# Patient Record
Sex: MALE | Race: OTHER | HISPANIC OR LATINO | Employment: FULL TIME | URBAN - METROPOLITAN AREA
[De-identification: names, ages, dates, MRNs, and addresses within clinical notes are randomized per-mention and may not be internally consistent; named-entity substitution may affect disease eponyms.]

---

## 2018-09-17 ENCOUNTER — APPOINTMENT (EMERGENCY)
Dept: RADIOLOGY | Facility: HOSPITAL | Age: 58
End: 2018-09-17

## 2018-09-17 ENCOUNTER — HOSPITAL ENCOUNTER (EMERGENCY)
Facility: HOSPITAL | Age: 58
Discharge: HOME | End: 2018-09-17
Attending: EMERGENCY MEDICINE | Admitting: EMERGENCY MEDICINE

## 2018-09-17 VITALS
HEART RATE: 91 BPM | RESPIRATION RATE: 20 BRPM | OXYGEN SATURATION: 95 % | SYSTOLIC BLOOD PRESSURE: 138 MMHG | DIASTOLIC BLOOD PRESSURE: 83 MMHG | TEMPERATURE: 96.9 F

## 2018-09-17 DIAGNOSIS — T40.1X1A HEROIN OVERDOSE (HCC): Primary | ICD-10-CM

## 2018-09-17 PROCEDURE — 71046 X-RAY EXAM CHEST 2 VIEWS: CPT

## 2018-09-17 PROCEDURE — 96374 THER/PROPH/DIAG INJ IV PUSH: CPT

## 2018-09-17 PROCEDURE — 99284 EMERGENCY DEPT VISIT MOD MDM: CPT

## 2018-09-17 RX ORDER — NALOXONE HYDROCHLORIDE 1 MG/ML
INJECTION INTRAMUSCULAR; INTRAVENOUS; SUBCUTANEOUS
Status: DISCONTINUED
Start: 2018-09-17 | End: 2018-09-17 | Stop reason: HOSPADM

## 2018-09-17 RX ORDER — ONDANSETRON 2 MG/ML
INJECTION INTRAMUSCULAR; INTRAVENOUS
Status: COMPLETED
Start: 2018-09-17 | End: 2018-09-17

## 2018-09-17 RX ORDER — ONDANSETRON 2 MG/ML
4 INJECTION INTRAMUSCULAR; INTRAVENOUS ONCE
Status: COMPLETED | OUTPATIENT
Start: 2018-09-17 | End: 2018-09-17

## 2018-09-17 RX ADMIN — ONDANSETRON 4 MG: 2 INJECTION INTRAMUSCULAR; INTRAVENOUS at 16:14

## 2018-09-17 RX ADMIN — ONDANSETRON 4 MG: 2 INJECTION, SOLUTION INTRAMUSCULAR; INTRAVENOUS at 16:14

## 2018-09-17 NOTE — ED PROVIDER NOTES
History  Chief Complaint   Patient presents with    Overdose - Accidental     pt states that he used 1 bag of heroin and has been drinking  pt given 2 mg intranasal narcan and 2 mg IV narcan     Patient is a 29-year-old male  History of heroin abuse  Was drinking alcohol today  Found unresponsive  EMS administered 2 mg of Narcan intranasally and 4 mg IV  Patient awoke  He began the vomit  Symptoms were severe  Improved with Narcan  None       History reviewed  No pertinent past medical history  History reviewed  No pertinent surgical history  History reviewed  No pertinent family history  I have reviewed and agree with the history as documented  Social History   Substance Use Topics    Smoking status: Never Smoker    Smokeless tobacco: Never Used    Alcohol use Yes        Review of Systems   Constitutional: Negative for chills and fever  Gastrointestinal: Positive for nausea and vomiting  Negative for abdominal pain  All other systems reviewed and are negative  Physical Exam  Physical Exam   Constitutional: He is oriented to person, place, and time  He appears well-developed and well-nourished  No distress  HENT:   Head: Normocephalic and atraumatic  Mouth/Throat: Oropharynx is clear and moist    Eyes: Conjunctivae and EOM are normal  Pupils are equal, round, and reactive to light  Right eye exhibits no discharge  Left eye exhibits no discharge  No scleral icterus  Neck: Normal range of motion  Neck supple  Cardiovascular: Normal rate, regular rhythm, normal heart sounds and intact distal pulses  Exam reveals no gallop and no friction rub  No murmur heard  Pulmonary/Chest: Effort normal and breath sounds normal  No respiratory distress  He has no wheezes  He has no rales  Abdominal: Soft  Bowel sounds are normal  He exhibits no distension  There is no tenderness  There is no rebound and no guarding  Musculoskeletal: Normal range of motion   He exhibits no edema, tenderness or deformity  Neurological: He is alert and oriented to person, place, and time  He has normal strength  No sensory deficit  GCS eye subscore is 4  GCS verbal subscore is 5  GCS motor subscore is 6  Skin: Skin is warm and dry  No rash noted  He is not diaphoretic  Psychiatric: He has a normal mood and affect  Vitals reviewed  Vital Signs  ED Triage Vitals [09/17/18 1606]   Temperature Pulse Respirations Blood Pressure SpO2   (!) 96 9 °F (36 1 °C) 92 14 124/77 95 %      Temp Source Heart Rate Source Patient Position - Orthostatic VS BP Location FiO2 (%)   Temporal Monitor Lying Left arm --      Pain Score       --           Vitals:    09/17/18 1606 09/17/18 1736   BP: 124/77    Pulse: 92 93   Patient Position - Orthostatic VS: Lying        Visual Acuity      ED Medications  Medications   naloxone (NARCAN) 2 MG/2ML injection **AcuDose Override Pull** (not administered)   ondansetron (ZOFRAN) injection 4 mg (4 mg Intravenous Given 9/17/18 1614)       Diagnostic Studies  Results Reviewed     None                 XR chest 2 views   ED Interpretation by Radha Owens MD (09/17 1702)   No infiltrates  No congestive heart failure  Final Result by Salvador Velasquez MD (09/17 1646)      No acute cardiopulmonary disease  Workstation performed: [id number]                    Procedures  Procedures       Phone Contacts  ED Phone Contact    ED Course                               [facility]  Number of Diagnoses or Management Options  Diagnosis management comments: Patient observed  No relapse  Appropriate for discharge and outpatient management      CritCare Time    Disposition  Final diagnoses:   Heroin overdose     Time reflects when diagnosis was documented in both MDM as applicable and the Disposition within this note     Time User Action Codes Description Comment    9/17/2018  5:56 PM Kandi, [address] Heroin overdose       ED Disposition     ED Disposition Condition Comment    Discharge  Wilfrido Martin discharge to home/self care  Condition at discharge: Good        Follow-up Information     Follow up With Specialties Details Why Contact Info    [employer]   Follow-up with family doctor later this week for re-evaluation, call for referral [phone]            Patient's Medications    No medications on file     No discharge procedures on file      ED Provider  Electronically Signed by           Ramona Phipps MD  09/17/18 [address] [place] [address] Flash Delong MD  09/17/18 Adin Henderson

## 2018-09-17 NOTE — DISCHARGE INSTRUCTIONS
Narcotic Abuse   WHAT YOU NEED TO KNOW:   Narcotics are medicines used to decrease or take away severe pain  Narcotics may also be called opioids  Some common names of narcotics ordered by a doctor are codeine and morphine  Heroin is an illegal street drug that is made from morphine  DISCHARGE INSTRUCTIONS:   Return to the emergency department if:   · You are very drowsy  · Your speech is slurred  · You have trouble thinking, remembering things, or focusing  Contact your healthcare provider if:   · You want help or information on how to stop using or abusing narcotics  Follow up with your healthcare provider as directed:  Write down your questions so you remember to ask them during your visits  Narcotic intoxication  usually lasts for several hours  You may have the following during or after you use narcotics:  · Behavior or mood changes, such as a great feeling followed by the feeling that you do not care about anyone or anything    · Trouble thinking, remembering things, or focusing    · Small pupils    · Feeling very drowsy    · Slurred speech  Narcotic withdrawal  occurs if you stop using narcotics after using them heavily over a period of time  Signs and symptoms may begin within minutes or days and continue for days or even months:  · Depression and anxiety    · Nausea or vomiting    · Muscle aches    · Watery eyes or runny nose    · Large pupils    · Sweating or goosebumps on your skin    · Diarrhea    · Fever    · Trouble sleeping  How narcotics can harm a pregnant woman and her baby:   · Tell your healthcare provider right away if you are trying to get pregnant or you are pregnant and you are using narcotics  Your doctor may suggest other medicines to control pain and prevent withdrawal  If you go through withdrawal while pregnant, you may miscarry your baby, or he may be stillborn  He may be very small and have other medical problems      · When your baby is born, he may show signs of withdrawal  This includes unexpected weight loss, poor feeding, and more crying than normal  Your baby may also have a fever, vomit, and have diarrhea  He may also have learning problems or other health issues when he gets older  If you have a baby and you are using narcotics, you may have trouble caring for your baby  Narcotics may be passed to your baby through breast milk  Talk to your healthcare provider before breastfeeding your baby if you are using narcotics  For support and more information:   · Substance Abuse and [place] , [address] [place]  Web Address: https://incrediblue/  · [facility] on Drug Abuse  [address] [place] [phone]  Phone: 0- 180 - 699-0185  Web Address: www stuart nih gov  © 2017 [address] [place] [address] Street is for End User's use only and may not be sold, redistributed or otherwise used for commercial purposes  All illustrations and images included in CareNotes® are the copyrighted property of A D A M , Inc  or Reynaldo Moran  The above information is an  only  It is not intended as medical advice for individual conditions or treatments  Talk to your doctor, nurse or pharmacist before following any medical regimen to see if it is safe and effective for you

## 2018-09-17 NOTE — ED NOTES
Pt walking with a steady gait with no assisstance  Pt walked across ER michelle of MD  Per MD pt may go home on his own       Stephanie Loredo RN  09/17/18 3107

## 2018-09-17 NOTE — ED NOTES
MD notified that pt's O2 is in the mid 80's when he falls asleep   Wants pt to be placed on 2 Liters O2 through nasal cannula     Mel Cormier RN  09/17/18 0099

## 2018-09-17 NOTE — ED NOTES
attempted to call phone numbers supplied by pt so someone can pick him up but phone numbers are not in service  Also called phone number in demographics and that was also not in service       Kavon Burgess, MIGUEL A  09/17/18 4211

## 2021-02-01 ENCOUNTER — HOSPITAL ENCOUNTER (EMERGENCY)
Facility: HOSPITAL | Age: 61
Discharge: HOME | End: 2021-02-01
Attending: EMERGENCY MEDICINE | Admitting: EMERGENCY MEDICINE
Payer: COMMERCIAL

## 2021-02-01 VITALS
DIASTOLIC BLOOD PRESSURE: 69 MMHG | WEIGHT: 185 LBS | RESPIRATION RATE: 12 BRPM | HEART RATE: 91 BPM | HEIGHT: 71 IN | OXYGEN SATURATION: 93 % | SYSTOLIC BLOOD PRESSURE: 113 MMHG | BODY MASS INDEX: 25.9 KG/M2 | TEMPERATURE: 98.8 F

## 2021-02-01 DIAGNOSIS — T50.901A ACCIDENTAL OVERDOSE, INITIAL ENCOUNTER: Primary | ICD-10-CM

## 2021-02-01 LAB
ALBUMIN SERPL BCP-MCNC: 4.1 G/DL (ref 3–5.2)
ALP SERPL-CCNC: 60 U/L (ref 43–122)
ALT SERPL W P-5'-P-CCNC: 20 U/L (ref 9–52)
ANION GAP SERPL CALCULATED.3IONS-SCNC: 15 MMOL/L (ref 5–14)
AST SERPL W P-5'-P-CCNC: 29 U/L (ref 17–59)
ATRIAL RATE: 95 BPM
BASOPHILS # BLD AUTO: 0 THOUSANDS/ΜL (ref 0–0.1)
BASOPHILS NFR BLD AUTO: 1 % (ref 0–1)
BILIRUB SERPL-MCNC: 0.7 MG/DL
BUN SERPL-MCNC: 16 MG/DL (ref 5–25)
CALCIUM SERPL-MCNC: 8.3 MG/DL (ref 8.4–10.2)
CHLORIDE SERPL-SCNC: 100 MMOL/L (ref 97–108)
CK MB SERPL-MCNC: 1.4 % (ref 0–2.5)
CK MB SERPL-MCNC: 6 NG/ML (ref 0–2.4)
CK SERPL-CCNC: 419 U/L (ref 55–170)
CO2 SERPL-SCNC: 19 MMOL/L (ref 22–30)
CREAT SERPL-MCNC: 0.86 MG/DL (ref 0.7–1.5)
EOSINOPHIL # BLD AUTO: 0 THOUSAND/ΜL (ref 0–0.4)
EOSINOPHIL NFR BLD AUTO: 0 % (ref 0–6)
ERYTHROCYTE [DISTWIDTH] IN BLOOD BY AUTOMATED COUNT: 14.7 %
GFR SERPL CREATININE-BSD FRML MDRD: 94 ML/MIN/1.73SQ M
GLUCOSE SERPL-MCNC: 285 MG/DL (ref 70–99)
HCT VFR BLD AUTO: 43.4 % (ref 41–53)
HGB BLD-MCNC: 14 G/DL (ref 13.5–17.5)
LACTATE SERPL-SCNC: 5 MMOL/L (ref 0.7–2)
LG PLATELETS BLD QL SMEAR: PRESENT
LYMPHOCYTES # BLD AUTO: 1.7 THOUSANDS/ΜL (ref 0.5–4)
LYMPHOCYTES NFR BLD AUTO: 28 % (ref 25–45)
MCH RBC QN AUTO: 31.9 PG (ref 26–34)
MCHC RBC AUTO-ENTMCNC: 32.2 G/DL (ref 31–36)
MCV RBC AUTO: 99 FL (ref 80–100)
MONOCYTES # BLD AUTO: 0.5 THOUSAND/ΜL (ref 0.2–0.9)
MONOCYTES NFR BLD AUTO: 8 % (ref 1–10)
NEUTROPHILS # BLD AUTO: 3.8 THOUSANDS/ΜL (ref 1.8–7.8)
NEUTS SEG NFR BLD AUTO: 63 % (ref 45–65)
P AXIS: 65 DEGREES
PLATELET # BLD AUTO: 145 THOUSANDS/UL (ref 150–450)
PLATELET BLD QL SMEAR: ABNORMAL
PMV BLD AUTO: 11.2 FL (ref 8.9–12.7)
POTASSIUM SERPL-SCNC: 3.8 MMOL/L (ref 3.6–5)
PR INTERVAL: 224 MS
PROT SERPL-MCNC: 6.8 G/DL (ref 5.9–8.4)
QRS AXIS: 22 DEGREES
QRSD INTERVAL: 144 MS
QT INTERVAL: 414 MS
QTC INTERVAL: 520 MS
RBC # BLD AUTO: 4.38 MILLION/UL (ref 4.5–5.9)
RBC MORPH BLD: NORMAL
SODIUM SERPL-SCNC: 134 MMOL/L (ref 137–147)
T WAVE AXIS: 16 DEGREES
TROPONIN I SERPL-MCNC: <0.01 NG/ML (ref 0–0.03)
TSH SERPL DL<=0.05 MIU/L-ACNC: 3.31 UIU/ML (ref 0.47–4.68)
VENTRICULAR RATE: 95 BPM
WBC # BLD AUTO: 6.1 THOUSAND/UL (ref 4.5–11)

## 2021-02-01 PROCEDURE — 82553 CREATINE MB FRACTION: CPT | Performed by: PHYSICIAN ASSISTANT

## 2021-02-01 PROCEDURE — 96374 THER/PROPH/DIAG INJ IV PUSH: CPT

## 2021-02-01 PROCEDURE — 83605 ASSAY OF LACTIC ACID: CPT | Performed by: PHYSICIAN ASSISTANT

## 2021-02-01 PROCEDURE — 96361 HYDRATE IV INFUSION ADD-ON: CPT

## 2021-02-01 PROCEDURE — 93005 ELECTROCARDIOGRAM TRACING: CPT

## 2021-02-01 PROCEDURE — 99285 EMERGENCY DEPT VISIT HI MDM: CPT

## 2021-02-01 PROCEDURE — 84443 ASSAY THYROID STIM HORMONE: CPT | Performed by: PHYSICIAN ASSISTANT

## 2021-02-01 PROCEDURE — 84484 ASSAY OF TROPONIN QUANT: CPT | Performed by: PHYSICIAN ASSISTANT

## 2021-02-01 PROCEDURE — 93010 ELECTROCARDIOGRAM REPORT: CPT | Performed by: INTERNAL MEDICINE

## 2021-02-01 PROCEDURE — 80053 COMPREHEN METABOLIC PANEL: CPT | Performed by: PHYSICIAN ASSISTANT

## 2021-02-01 PROCEDURE — 82550 ASSAY OF CK (CPK): CPT | Performed by: PHYSICIAN ASSISTANT

## 2021-02-01 PROCEDURE — 85025 COMPLETE CBC W/AUTO DIFF WBC: CPT | Performed by: PHYSICIAN ASSISTANT

## 2021-02-01 PROCEDURE — 36415 COLL VENOUS BLD VENIPUNCTURE: CPT | Performed by: PHYSICIAN ASSISTANT

## 2021-02-01 PROCEDURE — 99285 EMERGENCY DEPT VISIT HI MDM: CPT | Performed by: PHYSICIAN ASSISTANT

## 2021-02-01 RX ORDER — NALOXONE HYDROCHLORIDE 1 MG/ML
1 INJECTION INTRAMUSCULAR; INTRAVENOUS; SUBCUTANEOUS ONCE
Status: COMPLETED | OUTPATIENT
Start: 2021-02-01 | End: 2021-02-01

## 2021-02-01 RX ADMIN — SODIUM CHLORIDE 1000 ML: 0.9 INJECTION, SOLUTION INTRAVENOUS at 14:05

## 2021-02-01 RX ADMIN — SODIUM CHLORIDE 1000 ML: 0.9 INJECTION, SOLUTION INTRAVENOUS at 13:00

## 2021-02-01 RX ADMIN — NALOXONE HYDROCHLORIDE 1 MG: 1 INJECTION PARENTERAL at 14:52

## 2021-02-01 NOTE — ED NOTES
Ems said he was found down in the snow, pt cold, warm blankets and keenan hugger  Placed, pt states he snorted a little bag of heroin today and was having fun, wet clothes removed, "I feel fine now"     Marck Acosta RN  02/01/21 9880

## 2021-02-01 NOTE — ED PROVIDER NOTES
History  Chief Complaint   Patient presents with    Overdose - Accidental     44-year-old Hebrew-speaking male presents via EMS for reported heroin overdose  Patient arrives sleepy however arousable to verbal stimuli and does report the use of 1 bag of heroin  Patient is also noted to be hypothermic upon arrival   Patient denies complaints reports he is not suicidal or homicidal       History provided by:  Patient and EMS personnel   used: Yes    Overdose - Accidental  Ingested substance: heroin  Ingestion amount:  1 bag  Incident location: outside in the snow  Context: intentional ingestion ( recreationally)    Associated symptoms: no abdominal pain, no chest pain, no nausea, no shortness of breath and no vomiting        None       History reviewed  No pertinent past medical history  History reviewed  No pertinent surgical history  History reviewed  No pertinent family history  I have reviewed and agree with the history as documented  E-Cigarette/Vaping    E-Cigarette Use Never User      E-Cigarette/Vaping Substances     Social History     Tobacco Use    Smoking status: Heavy Tobacco Smoker     Packs/day: 0 50    Smokeless tobacco: Never Used   Substance Use Topics    Alcohol use: Yes     Frequency: 2-4 times a month     Drinks per session: 3 or 4    Drug use: Yes     Types: Heroin, Marijuana       Review of Systems   Constitutional: Negative for chills, fatigue and fever  HENT: Negative for congestion, ear pain, rhinorrhea and sore throat  Eyes: Negative for redness  Respiratory: Negative for chest tightness and shortness of breath  Cardiovascular: Negative for chest pain and palpitations  Gastrointestinal: Negative for abdominal pain, nausea and vomiting  Genitourinary: Negative for dysuria and hematuria  Musculoskeletal: Negative  Skin: Negative for rash  Neurological: Negative for dizziness, syncope, light-headedness and numbness         Physical Exam  Physical Exam  Vitals signs and nursing note reviewed  Constitutional:       Appearance: Normal appearance  He is well-developed  HENT:      Head: Normocephalic and atraumatic  Eyes:      General: No scleral icterus  Pupils: Pupils are equal, round, and reactive to light  Neck:      Musculoskeletal: Normal range of motion  Cardiovascular:      Rate and Rhythm: Normal rate and regular rhythm  Pulses: Normal pulses  Pulmonary:      Effort: Pulmonary effort is normal  No respiratory distress  Breath sounds: No stridor  Abdominal:      General: There is no distension  Palpations: There is no mass  Skin:     General: Skin is cool and dry  Capillary Refill: Capillary refill takes less than 2 seconds  Coloration: Skin is not jaundiced  Neurological:      Mental Status: He is alert and oriented to person, place, and time        Gait: Gait normal    Psychiatric:         Mood and Affect: Mood normal          Vital Signs  ED Triage Vitals [02/01/21 1243]   Temperature Pulse Respirations Blood Pressure SpO2   (!) 91 8 °F (33 2 °C) 96 14 158/93 93 %      Temp Source Heart Rate Source Patient Position - Orthostatic VS BP Location FiO2 (%)   Tympanic Monitor Lying Left arm --      Pain Score       --           Vitals:    02/01/21 1243 02/01/21 1324 02/01/21 1331 02/01/21 1430   BP: 158/93 107/74 105/72 113/69   Pulse: 96 93 93 91   Patient Position - Orthostatic VS: Lying  Lying Lying         Visual Acuity      ED Medications  Medications   sodium chloride 0 9 % bolus 1,000 mL (0 mL Intravenous Stopped 2/1/21 1404)   sodium chloride 0 9 % bolus 1,000 mL (1,000 mL Intravenous New Bag 2/1/21 1405)   naloxone (NARCAN) injection 1 mg (1 mg Intravenous Given 2/1/21 1452)       Diagnostic Studies  Results Reviewed     Procedure Component Value Units Date/Time    TSH [[id number]]  (Normal) Collected: 02/01/21 1320    Lab Status: Final result Specimen: Blood from Arm, Left Updated: 02/01/21 1406     TSH 3RD GENERATON 3 310 uIU/mL     Narrative:      Patients undergoing fluorescein dye angiography may retain small amounts of fluorescein in the body for 48-72 hours post procedure  Samples containing fluorescein can produce falsely depressed TSH values  If the patient had this procedure,a specimen should be resubmitted post fluorescein clearance        CKMB [[id number]]  (Abnormal) Collected: 02/01/21 1320    Lab Status: Final result Specimen: Blood from Arm, Left Updated: 02/01/21 1359     CK-MB Index 1 4 %      CK-MB 6 0 ng/mL     Troponin I [[id number]]  (Normal) Collected: 02/01/21 1320    Lab Status: Final result Specimen: Blood from Arm, Left Updated: 02/01/21 1347     Troponin I <0 01 ng/mL     Smear Review(Phlebs Do Not Order) [[id number]]  (Abnormal) Collected: 02/01/21 1255    Lab Status: Final result Specimen: Blood from Arm, Left Updated: 02/01/21 1339     RBC Morphology Normal     Platelet Estimate Borderline     Large Platelet Present    CK Total with Reflex CKMB [[id number]]  (Abnormal) Collected: 02/01/21 1320    Lab Status: Final result Specimen: Blood from Arm, Left Updated: 02/01/21 1338     Total  U/L     Comprehensive metabolic panel [[id number]]  (Abnormal) Collected: 02/01/21 1320    Lab Status: Final result Specimen: Blood from Arm, Left Updated: 02/01/21 1338     Sodium 134 mmol/L      Potassium 3 8 mmol/L      Chloride 100 mmol/L      CO2 19 mmol/L      ANION GAP 15 mmol/L      BUN 16 mg/dL      Creatinine 0 86 mg/dL      Glucose 285 mg/dL      Calcium 8 3 mg/dL      AST 29 U/L      ALT 20 U/L      Alkaline Phosphatase 60 U/L      Total Protein 6 8 g/dL      Albumin 4 1 g/dL      Total Bilirubin 0 70 mg/dL      eGFR 94 ml/min/1 73sq m     Narrative:      Odin guidelines for Chronic Kidney Disease (CKD):     Stage 1 with normal or high GFR (GFR > 90 mL/min/1 73 square meters)    Stage 2 Mild CKD (GFR = 60-89 mL/min/1 73 square meters)    Stage 3A Moderate CKD (GFR = 45-59 mL/min/1 73 square meters)    Stage 3B Moderate CKD (GFR = 30-44 mL/min/1 73 square meters)    Stage 4 Severe CKD (GFR = 15-29 mL/min/1 73 square meters)    Stage 5 End Stage CKD (GFR <15 mL/min/1 73 square meters)  Note: GFR calculation is accurate only with a steady state creatinine    Lactic acid, plasma [[id number]]  (Abnormal) Collected: 02/01/21 1255    Lab Status: Final result Specimen: Blood from Arm, Right Updated: 02/01/21 1318     LACTIC ACID 5 0 mmol/L     Narrative:      Result may be elevated if tourniquet was used during collection      Lactic acid 2 Hours [[id number]]     Lab Status: No result Specimen: Blood     CBC and differential [[id number]]  (Abnormal) Collected: 02/01/21 1255    Lab Status: Final result Specimen: Blood from Arm, Left Updated: 02/01/21 1304     WBC 6 10 Thousand/uL      RBC 4 38 Million/uL      Hemoglobin 14 0 g/dL      Hematocrit 43 4 %      MCV 99 fL      MCH 31 9 pg      MCHC 32 2 g/dL      RDW 14 7 %      MPV 11 2 fL      Platelets 341 Thousands/uL      Neutrophils Relative 63 %      Lymphocytes Relative 28 %      Monocytes Relative 8 %      Eosinophils Relative 0 %      Basophils Relative 1 %      Neutrophils Absolute 3 80 Thousands/µL      Lymphocytes Absolute 1 70 Thousands/µL      Monocytes Absolute 0 50 Thousand/µL      Eosinophils Absolute 0 00 Thousand/µL      Basophils Absolute 0 00 Thousands/µL                  No orders to display              Procedures  ECG 12 Lead Documentation Only    Date/Time: 2/1/2021 12:52 PM  Performed by: Lizeth Godwin PA-C  Authorized by: Lizeth Godwin PA-C     Indications / Diagnosis:  Hypothermia  Patient location:  ED  Interpretation:     Interpretation: normal    Rate:     ECG rate:  95    ECG rate assessment: normal    Rhythm:     Rhythm: sinus rhythm    Ectopy:     Ectopy: none    QRS:     QRS axis:  Left    QRS intervals:  Normal  Conduction:     Conduction: normal    ST segments:     ST segments:  Normal  T waves:     T waves: non-specific    Q waves:     Q waves:  III  Comments:      qtc 520             ED Course  ED Course as of Feb 01 1521   Mon Feb 01, 2021   1411 Patient normothermic at this time, arousable to verbal stimuli, oriented and requesting to go home  Patient falls asleep very easily after interaction though  Will administer 1 additional mg of Narcan and prepare for discharge      1513 Patient's IV removed by myself, gauze pad and tape placed for hemostasis patient alert and oriented at time of discharge  Patient was reexamined at this time and informed of laboratory and/or imaging results and was found to be stable for discharge  Return to emergency department criteria was reviewed with the patient who verbalized understanding and was agreeable to discharge and the treatment plan at this time  SBIRT 20yo+      Most Recent Value   SBIRT (24 yo +)   In order to provide better care to our patients, we are screening all of our patients for alcohol and drug use  Would it be okay to ask you these screening questions? Yes Filed at: 02/01/2021 1249   Initial Alcohol Screen: US AUDIT-C    1  How often do you have a drink containing alcohol? 2 Filed at: 02/01/2021 1249   2  How many drinks containing alcohol do you have on a typical day you are drinking? 2 Filed at: 02/01/2021 1249   3a  Male UNDER 65: How often do you have five or more drinks on one occasion? 0 Filed at: 02/01/2021 1249   3b  FEMALE Any Age, or MALE 65+: How often do you have 4 or more drinks on one occassion? 0 Filed at: 02/01/2021 1249   Audit-C Score  4 Filed at: 02/01/2021 1249   MARIA C: How many times in the past year have you    Used an illegal drug or used a prescription medication for non-medical reasons? Weekly Filed at: 02/01/2021 1249   DAST-10: In the past 12 months      1  Have you used drugs other than those required for medical reasons?   1 Filed at: 02/01/2021 1242 2  Do you use more than one drug at a time? 1 Filed at: 02/01/2021 1249   3  Have you had medical problems as a result of your drug use (e g , memory loss, hepatitis, convulsions, bleeding, etc )? 0 Filed at: 02/01/2021 1249   4  Have you had "blackouts" or "flashbacks" as a result of drug use? YesNo  1 Filed at: 02/01/2021 1249   5  Do you ever feel bad or guilty about your drug use? 0 Filed at: 02/01/2021 1249   6  Does your spouse (or parent) ever complain about your involvement with drugs? 0 Filed at: 02/01/2021 1249   7  Have you neglected your family because of your use of drugs? 0 Filed at: 02/01/2021 1249   8  Have you engaged in illegal activities in order to obtain drugs? 0 Filed at: 02/01/2021 1249   9  Have you ever experienced withdrawal symptoms (felt sick) when you stopped taking drugs? 0 Filed at: 02/01/2021 1249   10  Are you always able to stop using drugs when you want to?  0 Filed at: 02/01/2021 1249   DAST-10 Score  3 Filed at: 02/01/2021 1249                    MDM  Number of Diagnoses or Management Options  Accidental overdose, initial encounter:   Diagnosis management comments: All imaging and/or lab testing discussed with patient, strict return to ED precautions discussed  Patient recommended to follow up promptly with appropriate outpatient provider  Patient and/or family members verbalizes understanding and agrees with plan  Patient is stable for discharge      Portions of the record may have been created with voice recognition software  Occasional wrong word or "sound a like" substitutions may have occurred due to the inherent limitations of voice recognition software  Read the chart carefully and recognize, using context, where substitutions have occurred          Disposition  Final diagnoses:   Accidental overdose, initial encounter     Time reflects when diagnosis was documented in both MDM as applicable and the Disposition within this note     Time User Action Codes Description Comment    2/1/2021  3:09 PM Chad Chambers Accidental overdose, initial encounter       ED Disposition     ED Disposition Condition Date/Time Comment    Discharge Stable Mon Feb 1, 2021  3:09 PM Norris Unger discharge to home/self care  Follow-up Information     Follow up With Specialties Details Why Contact Info    Genet Bray MD Family Medicine Schedule an appointment as soon as possible for a visit in 2 days As needed [address]  [phone]            Patient's Medications    No medications on file     No discharge procedures on file      PDMP Review     None          ED Provider  Electronically Signed by           Lizeth Godwin PA-C  02/01/21 0387

## 2021-06-21 ENCOUNTER — OFFICE VISIT (OUTPATIENT)
Dept: URGENT CARE | Age: 61
End: 2021-06-21

## 2021-06-21 DIAGNOSIS — Z02.4 DRIVER'S PERMIT PE (PHYSICAL EXAMINATION): Primary | ICD-10-CM

## 2021-06-21 NOTE — PROGRESS NOTES
Patient was refunded due to discussing with patient that he needs to see his PCP to have this physical done  Pt is Chilean speaking only, used  line to explain to pt why and became upset  He walked out and demanded his paperwork and took his money  Stated that he had no doctor and when offered to give him a list or number to help obtain one, started yelling in [address] and walked out the office  No physical was done today and pt was refunded       SHAYLA Toribio

## 2021-07-22 ENCOUNTER — HOSPITAL ENCOUNTER (EMERGENCY)
Facility: HOSPITAL | Age: 61
Discharge: HOME | End: 2021-07-23
Attending: EMERGENCY MEDICINE

## 2021-07-22 DIAGNOSIS — F19.10 SUBSTANCE ABUSE (HCC): Primary | ICD-10-CM

## 2021-07-22 PROCEDURE — 99284 EMERGENCY DEPT VISIT MOD MDM: CPT

## 2021-07-22 PROCEDURE — 99282 EMERGENCY DEPT VISIT SF MDM: CPT | Performed by: PHYSICIAN ASSISTANT

## 2021-07-23 VITALS
OXYGEN SATURATION: 97 % | RESPIRATION RATE: 18 BRPM | SYSTOLIC BLOOD PRESSURE: 161 MMHG | DIASTOLIC BLOOD PRESSURE: 95 MMHG | HEART RATE: 75 BPM | WEIGHT: 180 LBS | TEMPERATURE: 98 F | BODY MASS INDEX: 25.1 KG/M2

## 2021-07-23 NOTE — ED NOTES
Patient is awake, alert and oriented  States that he is ready to go home  Speech is clear  No complaints offered  Gait steady       Gabino Moreno, MIGUEL A  07/23/21 0016

## 2021-07-23 NOTE — ED PROVIDER NOTES
History  Chief Complaint   Patient presents with    Overdose - Accidental     pt smoked K2 and was found rolling in the road  no complaints on arrival      Patient is a 80-year-old male presents today via EMS for reported overdose patient admits the use of K2 was found by EMS rolling the road  Patient has no complaints upon arrival however is tangential and days erratically in behavior and speech  Patient is conscious alert oriented and admits to the use of K2 and is agreeable to an observation  In the emergency department till clinically sober  Patient denies suicidality homicidality polysubstance use  History provided by:  Patient   used: Yes        None       History reviewed  No pertinent past medical history  History reviewed  No pertinent surgical history  History reviewed  No pertinent family history  I have reviewed and agree with the history as documented  E-Cigarette/Vaping    E-Cigarette Use Never User      E-Cigarette/Vaping Substances     Social History     Tobacco Use    Smoking status: Heavy Tobacco Smoker     Packs/day: 0 50    Smokeless tobacco: Never Used   Vaping Use    Vaping Use: Never used   Substance Use Topics    Alcohol use: Yes    Drug use: Yes     Types: Heroin, Marijuana       Review of Systems   Reason unable to perform ROS: Patient denies any complaints  Constitutional: Negative for chills, fatigue and fever  HENT: Negative for congestion, ear pain, rhinorrhea and sore throat  Eyes: Negative for redness  Respiratory: Negative for chest tightness and shortness of breath  Cardiovascular: Negative for chest pain and palpitations  Gastrointestinal: Negative for abdominal pain, nausea and vomiting  Genitourinary: Negative for dysuria and hematuria  Musculoskeletal: Negative  Skin: Negative for rash  Neurological: Negative for dizziness, syncope, light-headedness and numbness         Physical Exam  Physical Exam  Vitals and nursing note reviewed  Constitutional:       Appearance: Normal appearance  He is well-developed  Comments: Patient is well-appearing in no acute distress falls asleep when not interacted with however he is conscious alert and oriented when given verbal stimuli  HENT:      Head: Normocephalic and atraumatic  Eyes:      General: No scleral icterus  Pupils: Pupils are equal, round, and reactive to light  Cardiovascular:      Rate and Rhythm: Normal rate and regular rhythm  Pulses: Normal pulses  Pulmonary:      Effort: Pulmonary effort is normal  No respiratory distress  Breath sounds: No stridor  Abdominal:      General: There is no distension  Palpations: There is no mass  Musculoskeletal:      Cervical back: Normal range of motion  Skin:     General: Skin is warm and dry  Capillary Refill: Capillary refill takes less than 2 seconds  Coloration: Skin is not jaundiced  Neurological:      Mental Status: He is alert and oriented to person, place, and time        Gait: Gait normal    Psychiatric:         Mood and Affect: Mood normal          Vital Signs  ED Triage Vitals [07/22/21 2107]   Temperature Pulse Respirations Blood Pressure SpO2   98 °F (36 7 °C) (!) 111 20 (!) 193/130 96 %      Temp Source Heart Rate Source Patient Position - Orthostatic VS BP Location FiO2 (%)   Tympanic Monitor Sitting Left arm --      Pain Score       --           Vitals:    07/22/21 2107 07/22/21 2127 07/23/21 0010   BP: (!) 193/130 123/81 161/95   Pulse: (!) 111 81 75   Patient Position - Orthostatic VS: Sitting Lying Lying         Visual Acuity  Visual Acuity      Most Recent Value   L Pupil Size (mm)  3   R Pupil Size (mm)  3          ED Medications  Medications - No data to display    Diagnostic Studies  Results Reviewed     None                 No orders to display              Procedures  Procedures         ED Course                             SBIRT 22yo+      Most Recent Value SBIRT (23 yo +)   In order to provide better care to our patients, we are screening all of our patients for alcohol and drug use  Would it be okay to ask you these screening questions? Unable to answer at this time Filed at: 07/22/2021 2111                    MDM      Disposition  Final diagnoses:   Substance abuse [facility])     Time reflects when diagnosis was documented in both MDM as applicable and the Disposition within this note     Time User Action Codes Description Comment    7/23/2021 12:06 AM Shelly Albright Add [F19 10] Substance abuse [facility])       ED Disposition     ED Disposition Condition Date/Time Comment    Discharge Stable Fri Jul 23, 2021 12:06 AM Joelle More discharge to home/self care  Follow-up Information     Follow up With Specialties Details Why Contact Info Additional [address]   [address], [facility] [place] [phone]  [address], [address], [address] [place], [place], [address] [place] [address] Avenue          There are no discharge medications for this patient  No discharge procedures on file      PDMP Review     None          ED Provider  Electronically Signed by           Andria Warner PA-C  07/26/21 7610

## 2021-07-23 NOTE — ED CARE HANDOFF
Emergency Department Sign Out Note        Sign out and transfer of care from [facility]  See Separate Emergency Department note  The patient, Luisa Francisco, was evaluated by the previous provider for substance abuse  Workup Completed:  Please see separate note    ED Course / Workup Pending (followup):  Pending sobriety  Patient now alert, oriented  Requesting to leave at this time  Clinically sober  Patient discharged from department with steady gait                                     Procedures  MDM    Disposition  Final diagnoses:   Substance abuse ([address] )     Time reflects when diagnosis was documented in both MDM as applicable and the Disposition within this note     Time User Action Codes Description Comment    7/23/2021 12:06 AM Any Albright Add [F19 10] Substance abuse [facility])       ED Disposition     ED Disposition Condition Date/Time Comment    Discharge Stable Fri Jul 23, 2021 12:06 AM Luisa Francisco discharge to home/self care  Follow-up Information     Follow up With Specialties Details Why Contact Info Additional [address]   [address], [address] [facility] [phone]  [address] [place] [address], [address], [address] [place], [place], [address]        Patient's Medications    No medications on file     No discharge procedures on file         ED Provider  Electronically Signed by     Carlee Nolasco PA-C  07/23/21 0011

## 2021-07-23 NOTE — ED NOTES
Patient is resting comfortably       [facility], [address] [facility]  07/22/21 2686

## 2021-07-23 NOTE — DISCHARGE INSTRUCTIONS
Please follow up with your family doctor  If you do not have one, I have provided you with a referral  Please return to the ER with any worsening symptoms

## 2021-07-27 NOTE — ED ATTENDING ATTESTATION
I was the attending physician on duty at the time the patient visited the emergency department  The patient was evaluated and dispositioned by the APC  I was personally available for consultation  I am administratively signing the chart after the fact      Ghulam Lozada MD

## 2021-10-22 ENCOUNTER — HOSPITAL ENCOUNTER (EMERGENCY)
Facility: HOSPITAL | Age: 61
Discharge: HOME | End: 2021-10-22
Attending: EMERGENCY MEDICINE

## 2021-10-22 VITALS
TEMPERATURE: 96.6 F | HEART RATE: 88 BPM | RESPIRATION RATE: 18 BRPM | OXYGEN SATURATION: 99 % | BODY MASS INDEX: 25.1 KG/M2 | WEIGHT: 180 LBS | DIASTOLIC BLOOD PRESSURE: 92 MMHG | SYSTOLIC BLOOD PRESSURE: 137 MMHG

## 2021-10-22 DIAGNOSIS — T50.901A ACCIDENTAL OVERDOSE: ICD-10-CM

## 2021-10-22 DIAGNOSIS — F19.10 SUBSTANCE ABUSE (HCC): Primary | ICD-10-CM

## 2021-10-22 PROCEDURE — 99284 EMERGENCY DEPT VISIT MOD MDM: CPT

## 2021-10-22 PROCEDURE — 99284 EMERGENCY DEPT VISIT MOD MDM: CPT | Performed by: PHYSICIAN ASSISTANT

## 2021-10-22 RX ADMIN — NALOXONE HYDROCHLORIDE 4 MG: 4 SPRAY NASAL at 21:34

## 2022-08-29 ENCOUNTER — HOSPITAL ENCOUNTER (EMERGENCY)
Facility: HOSPITAL | Age: 62
Discharge: HOME | End: 2022-08-29
Attending: EMERGENCY MEDICINE | Admitting: EMERGENCY MEDICINE

## 2022-08-29 VITALS
RESPIRATION RATE: 16 BRPM | WEIGHT: 161.82 LBS | BODY MASS INDEX: 22.57 KG/M2 | SYSTOLIC BLOOD PRESSURE: 127 MMHG | DIASTOLIC BLOOD PRESSURE: 76 MMHG | OXYGEN SATURATION: 96 % | TEMPERATURE: 98.4 F | HEART RATE: 93 BPM

## 2022-08-29 DIAGNOSIS — T40.1X1A ACCIDENTAL OVERDOSE OF HEROIN, INITIAL ENCOUNTER (HCC): Primary | ICD-10-CM

## 2022-08-29 PROCEDURE — 99284 EMERGENCY DEPT VISIT MOD MDM: CPT | Performed by: EMERGENCY MEDICINE

## 2022-08-29 PROCEDURE — 99284 EMERGENCY DEPT VISIT MOD MDM: CPT

## 2022-08-29 RX ORDER — ONDANSETRON 2 MG/ML
INJECTION INTRAMUSCULAR; INTRAVENOUS
Status: COMPLETED
Start: 2022-08-29 | End: 2022-08-29

## 2022-08-30 NOTE — ED PROVIDER NOTES
History  Chief Complaint   Patient presents with    Overdose - Accidental     Arrives via EMS after being found down; community bystander gave narcan IN  Zofran given by EMS for vomiting  Pt supposedly did fentanyl  Patient is a 70-year-old male brought in by [facility] after [place]  Given 4 mg of intranasal narcan by bystanders with improvement of symptoms  Admits to using heroin tonight  Denies any other use  No complaints at this time  Per review of Epic, patient has a history of abuse  None       History reviewed  No pertinent past medical history  History reviewed  No pertinent surgical history  History reviewed  No pertinent family history  I have reviewed and agree with the history as documented  E-Cigarette/Vaping    E-Cigarette Use Never User      E-Cigarette/Vaping Substances     Social History     Tobacco Use    Smoking status: Heavy Tobacco Smoker     Packs/day: 0 50    Smokeless tobacco: Never Used   Vaping Use    Vaping Use: Never used   Substance Use Topics    Alcohol use: Yes    Drug use: Yes     Types: Heroin, Marijuana     Comment: K2       Review of Systems   Constitutional: Positive for activity change  HENT: Negative  Eyes: Negative  Respiratory: Negative  Cardiovascular: Negative  Gastrointestinal: Negative  Endocrine: Negative  Genitourinary: Negative  Musculoskeletal: Negative  Skin: Negative  Allergic/Immunologic: Negative  Neurological: Negative  Hematological: Negative  Psychiatric/Behavioral: Negative  All other systems reviewed and are negative  Physical Exam  Physical Exam  Vitals and nursing note reviewed  Constitutional:       Appearance: Normal appearance  HENT:      Head: Normocephalic and atraumatic  Mouth/Throat:      Mouth: Mucous membranes are moist       Pharynx: Oropharynx is clear  Cardiovascular:      Rate and Rhythm: Normal rate and regular rhythm  Pulses: Normal pulses  Heart sounds: Normal heart sounds  Pulmonary:      Effort: Pulmonary effort is normal       Breath sounds: Normal breath sounds  Abdominal:      General: Bowel sounds are normal       Palpations: Abdomen is soft  Musculoskeletal:         General: Normal range of motion  Cervical back: Normal range of motion  Skin:     General: Skin is warm and dry  Capillary Refill: Capillary refill takes less than 2 seconds  Neurological:      General: No focal deficit present  Mental Status: He is alert and oriented to person, place, and time  Vital Signs  ED Triage Vitals [08/29/22 2139]   Temperature Pulse Respirations Blood Pressure SpO2   98 4 °F (36 9 °C) 93 16 133/83 95 %      Temp Source Heart Rate Source Patient Position - Orthostatic VS BP Location FiO2 (%)   Oral Monitor Lying Left arm --      Pain Score       --           Vitals:    08/29/22 2139 08/29/22 2200   BP: 133/83 125/77   Pulse: 93    Patient Position - Orthostatic VS: Lying          Visual Acuity      ED Medications  Medications   ondansetron (ZOFRAN) 4 mg/2 mL injection **ADS Override Pull** (  Given to EMS 8/29/22 2145)       Diagnostic Studies  Results Reviewed     None                 No orders to display              Procedures  Procedures         ED Course  ED Course as of 08/29/22 2238   Mon Aug 29, 2022   2237 Patient awake and alert  Will dc  IV dc'd                                                MDM  Number of Diagnoses or Management Options     Amount and/or Complexity of Data Reviewed  Obtain history from someone other than the patient: yes  Review and summarize past medical records: yes        Disposition  Final diagnoses:   Accidental overdose of heroin, initial encounter [facility])     Time reflects when diagnosis was documented in both MDM as applicable and the Disposition within this note     Time User Action Codes Description Comment    8/29/2022 10:04 PM Meredith Cunningham Add [T40 1X1A] Accidental overdose of heroin, initial encounter [facility])       ED Disposition     ED Disposition   Discharge    Condition   Stable    Date/Time   Mon Aug 29, 2022 10:37 PM    Comment   Nita Montana discharge to home/self care  Follow-up Information    None         Patient's Medications    No medications on file       No discharge procedures on file      PDMP Review     None          ED Provider  Electronically Signed by           Mingo Chou MD  08/29/22 MD Yaneth  08/29/22 7787

## 2023-06-01 ENCOUNTER — HOSPITAL ENCOUNTER (EMERGENCY)
Facility: HOSPITAL | Age: 63
Discharge: HOME | End: 2023-06-01
Attending: EMERGENCY MEDICINE

## 2023-06-01 VITALS
DIASTOLIC BLOOD PRESSURE: 91 MMHG | OXYGEN SATURATION: 100 % | SYSTOLIC BLOOD PRESSURE: 145 MMHG | TEMPERATURE: 98.2 F | RESPIRATION RATE: 16 BRPM | HEART RATE: 68 BPM

## 2023-06-01 DIAGNOSIS — F12.929 SYNTHETIC CANNABINOID INTOXICATION (HCC): Primary | ICD-10-CM

## 2023-06-01 LAB — GLUCOSE SERPL-MCNC: 161 MG/DL (ref 65–140)

## 2023-06-01 RX ADMIN — NALOXONE HYDROCHLORIDE 4 MG: 4 SPRAY NASAL at 13:26

## 2023-06-01 NOTE — CERTIFIED RECOVERY SPECIALIST
Certified  Note    Patient name: Ekta Baer  Location: [address] [place] [address] Avenue: [address] [place] [address]  Attending:  Arron Tomlinson* MRN [id number]  : 1960  Age: 61 y o  Sex: male Date 2023         Substance Use History:     Social History     Substance and Sexual Activity   Alcohol Use Yes        Social History     Substance and Sexual Activity   Drug Use Yes   • Types: Heroin, Marijuana    Comment: K2     Consult received for patient  CRS is not in [address] [place] [address] at this time    Will follow up    Jasen Johansen

## 2023-06-01 NOTE — ED PROVIDER NOTES
"History  Chief Complaint   Patient presents with   • Overdose - Accidental     Found by EMS on sidewalk sitting up sleeping  Pt only admits to doing marijuana (maybe K3       51-year-old male with no significant past medical history but on chart review has previous encounters for opioid overdoses presenting with a friend for evaluation of decreased responsiveness after using \"K2\"  Patient currently denies acute complaints  Denies other drug use  Denies alcohol use  States he did not fall  Patient was reportedly found sitting on the sidewalk staring into space  EMS reported pinpoint pupils but he did not receive any naloxone or other interventions  Has abrasion to right elbow  History provided by:  Patient and EMS personnel   used: Yes        None       History reviewed  No pertinent past medical history  History reviewed  No pertinent surgical history  History reviewed  No pertinent family history  I have reviewed and agree with the history as documented  E-Cigarette/Vaping   • E-Cigarette Use Never User      E-Cigarette/Vaping Substances     Social History     Tobacco Use   • Smoking status: Heavy Smoker     Packs/day: 0 50     Types: Cigarettes   • Smokeless tobacco: Never   Vaping Use   • Vaping Use: Never used   Substance Use Topics   • Alcohol use: Yes   • Drug use: Yes     Types: Heroin, Marijuana     Comment: K2       Review of Systems   Constitutional: Negative for chills and fever  HENT: Negative for congestion, rhinorrhea and sore throat  Respiratory: Negative for cough and shortness of breath  Cardiovascular: Negative for chest pain  Gastrointestinal: Negative for abdominal pain, diarrhea, nausea and vomiting  Musculoskeletal: Negative for arthralgias and myalgias  Skin: Positive for wound  Negative for color change and rash  Neurological: Negative for weakness and headaches  Hematological: Does not bruise/bleed easily     Psychiatric/Behavioral: " Negative for self-injury and suicidal ideas  Physical Exam  Physical Exam  Vitals and nursing note reviewed  Constitutional:       General: He is not in acute distress  Appearance: He is not ill-appearing, toxic-appearing or diaphoretic  HENT:      Head: Normocephalic and atraumatic  Nose: Nose normal       Mouth/Throat:      Mouth: Mucous membranes are moist    Eyes:      General: No scleral icterus  Right eye: No discharge  Left eye: No discharge  Extraocular Movements: Extraocular movements intact  Conjunctiva/sclera: Conjunctivae normal       Pupils: Pupils are equal, round, and reactive to light  Comments: Pupils 3-4 mm bilaterally   Cardiovascular:      Rate and Rhythm: Normal rate and regular rhythm  Heart sounds: No murmur heard  Pulmonary:      Effort: Pulmonary effort is normal  No respiratory distress  Breath sounds: No wheezing, rhonchi or rales  Abdominal:      General: There is no distension  Palpations: Abdomen is soft  Tenderness: There is no abdominal tenderness  There is no guarding or rebound  Musculoskeletal:         General: No swelling or deformity  Cervical back: Neck supple  Skin:     General: Skin is warm and dry  Comments: Wound to right elbow, no bony tenderness to palpation   Neurological:      General: No focal deficit present  Mental Status: He is alert and oriented to person, place, and time     Psychiatric:         Mood and Affect: Mood normal          Behavior: Behavior normal          Vital Signs  ED Triage Vitals   Temperature Pulse Respirations Blood Pressure SpO2   06/01/23 1201 06/01/23 1144 06/01/23 1144 06/01/23 1144 06/01/23 1144   98 2 °F (36 8 °C) 92 16 (!) 179/118 97 %      Temp Source Heart Rate Source Patient Position - Orthostatic VS BP Location FiO2 (%)   06/01/23 1201 06/01/23 1144 06/01/23 1144 06/01/23 1144 --   Oral Monitor Lying Left arm       Pain Score       06/01/23 1215 No Pain           Vitals:    06/01/23 1144   BP: (!) 179/118   Pulse: 92   Patient Position - Orthostatic VS: Lying         Visual Acuity      ED Medications  Medications   tetanus-diphtheria-acellular pertussis (BOOSTRIX) IM injection 0 5 mL (0 5 mL Intramuscular Not Given 6/1/23 1321)   naloxone nasal- Given to patient by provider at discharge  (NARCAN) 4 mg/0 1 mL nasal spray 4 mg (has no administration in time range)       Diagnostic Studies  Results Reviewed     Procedure Component Value Units Date/Time    Fingerstick Glucose (POCT) [[id number]]  (Abnormal) Collected: 06/01/23 1152    Lab Status: Final result Updated: 06/01/23 1153     POC Glucose 161 mg/dl                  No orders to display              Procedures  Procedures         ED Course  ED Course as of 06/01/23 1325   Thu Jun 01, 2023   1159 POC Glucose(!): 161   1317 Patient re-evaluated; resting comfortably  No signs of toxicity  Patient offered detox and rehabilitation--declined  CRS consult placed  Will discharge patient with naloxone to go  SBIRT 22yo+    Flowsheet Row Most Recent Value   Initial Alcohol Screen: US AUDIT-C     1  How often do you have a drink containing alcohol? 0 Filed at: 06/01/2023 1204   2  How many drinks containing alcohol do you have on a typical day you are drinking? 0 Filed at: 06/01/2023 1204   3a  Male UNDER 65: How often do you have five or more drinks on one occasion? 0 Filed at: 06/01/2023 1204   3b  FEMALE Any Age, or MALE 65+: How often do you have 4 or more drinks on one occassion? 0 Filed at: 06/01/2023 1204   Audit-C Score 0 Filed at: 06/01/2023 1204   MARIA C: How many times in the past year have you    Used an illegal drug or used a prescription medication for non-medical reasons? Never Filed at: 06/01/2023 1204                    Medical Decision Making  77-year-old male presenting for evaluation of intoxication  On arrival, patient is awake and alert in no acute distress  He denies acute complaints  He is overall well-appearing with reassuring vital signs aside from mild hypertension  Patient endorses synthetic cannabinoid use today  No evidence of opioid toxicity  No evidence of sympathomimetic toxicity  Patient observed in the emergency department without acute change  Tetanus vaccination updated given abrasion and unclear last vaccination but patient refused  On reevaluation, patient states he feels well and is ready for discharge  Offered detox and rehabilitation, which patient declined  Placed a consult for the certified    Patient provided with naloxone to go and counseled on its use  He will follow-up with PCP as needed  Return precautions discussed  Patient is in agreement and understanding of these instructions  Amount and/or Complexity of Data Reviewed  Labs: ordered  Decision-making details documented in ED Course  Risk  Prescription drug management  Disposition  Final diagnoses:   Synthetic cannabinoid intoxication ([address] )     Time reflects when diagnosis was documented in both MDM as applicable and the Disposition within this note     Time User Action Codes Description Comment    6/1/2023  1:20 PM Trey Anderson Add [G84 132] Synthetic cannabinoid intoxication [facility])       ED Disposition     ED Disposition   Discharge    Condition   Stable    Date/Time   Thu Jun 1, 2023  1:20 PM    Comment   Heidy Linda discharge to home/self care  Follow-up Information     Follow up With Specialties Details Why [address] [facility] 2nd Floor Family Medicine  As needed [address] [facility] [address]  [phone]            Patient's Medications    No medications on file       No discharge procedures on file      PDMP Review     None          ED Provider  Electronically Signed by           Frederick Sharp MD  06/01/23 0176

## 2023-07-27 ENCOUNTER — HOSPITAL ENCOUNTER (EMERGENCY)
Facility: HOSPITAL | Age: 63
Discharge: HOME | End: 2023-07-27
Attending: EMERGENCY MEDICINE | Admitting: EMERGENCY MEDICINE
Payer: COMMERCIAL

## 2023-07-27 VITALS
BODY MASS INDEX: 25 KG/M2 | HEART RATE: 86 BPM | OXYGEN SATURATION: 97 % | SYSTOLIC BLOOD PRESSURE: 143 MMHG | RESPIRATION RATE: 15 BRPM | WEIGHT: 178.57 LBS | HEIGHT: 71 IN | TEMPERATURE: 98.1 F | DIASTOLIC BLOOD PRESSURE: 89 MMHG

## 2023-07-27 DIAGNOSIS — T50.901A ACCIDENTAL OVERDOSE, INITIAL ENCOUNTER: Primary | ICD-10-CM

## 2023-07-27 PROCEDURE — 99284 EMERGENCY DEPT VISIT MOD MDM: CPT | Performed by: EMERGENCY MEDICINE

## 2023-07-27 PROCEDURE — 99284 EMERGENCY DEPT VISIT MOD MDM: CPT

## 2023-07-27 RX ORDER — NALOXONE HYDROCHLORIDE 1 MG/ML
1 INJECTION PARENTERAL ONCE
Status: COMPLETED | OUTPATIENT
Start: 2023-07-27 | End: 2023-07-27

## 2023-07-27 RX ORDER — ONDANSETRON 2 MG/ML
1 INJECTION INTRAMUSCULAR; INTRAVENOUS ONCE
Status: COMPLETED | OUTPATIENT
Start: 2023-07-27 | End: 2023-07-27

## 2023-07-27 NOTE — ED NOTES
Patient arrives pov complaining of cloudy urine w/ foul odor for 2 days. Patient reports lower abdominal pain. Patient provided with a paper scrub top, left with his keys and his Stefany Prakash, MIGUEL A  07/27/23 4409

## 2023-07-27 NOTE — ED PROVIDER NOTES
History  Chief Complaint   Patient presents with   • Overdose - Accidental     [place] EMS called. Given IN 4mg Narcan, 1mg IV and zofran. Patient reports he smoked a "laced blunt". GCS 13     60-year-old male, presents by EMS after possible overdose. Patient was found in the park unresponsive and not breathing, given 4 mg intranasal Narcan followed by 1 mg IV Narcan. Afterwards patient reportedly woke up with nausea and vomiting, received Zofran. Patient states he was smoking a blunt in the park, denies any intravenous drug use or alcohol use. Patient denies any thoughts of harming himself. Patient denies any current complaints. History provided by:  Patient and EMS personnel   used: Yes    Overdose - Accidental      None       History reviewed. No pertinent past medical history. History reviewed. No pertinent surgical history. History reviewed. No pertinent family history. I have reviewed and agree with the history as documented. E-Cigarette/Vaping   • E-Cigarette Use Never User      E-Cigarette/Vaping Substances     Social History     Tobacco Use   • Smoking status: Heavy Smoker     Packs/day: 0.50     Types: Cigarettes   • Smokeless tobacco: Never   Vaping Use   • Vaping Use: Never used   Substance Use Topics   • Alcohol use: Yes   • Drug use: Yes     Types: Heroin, Marijuana     Comment: K2       Review of Systems   Constitutional: Negative. Respiratory: Negative. Cardiovascular: Negative. Gastrointestinal: Negative. Neurological: Negative. Physical Exam  Physical Exam  Vitals and nursing note reviewed. Constitutional:       General: He is not in acute distress. HENT:      Head: Normocephalic and atraumatic. Mouth/Throat:      Mouth: Mucous membranes are moist.      Pharynx: Oropharynx is clear. Eyes:      Extraocular Movements: Extraocular movements intact. Pupils: Pupils are equal, round, and reactive to light. Cardiovascular:      Rate and Rhythm: Normal rate and regular rhythm. Pulmonary:      Effort: Pulmonary effort is normal.      Breath sounds: Normal breath sounds. Abdominal:      Palpations: Abdomen is soft. Tenderness: There is no abdominal tenderness. Musculoskeletal:         General: Normal range of motion. Skin:     General: Skin is warm and dry. Neurological:      General: No focal deficit present. Mental Status: He is alert and oriented to person, place, and time. Vital Signs  ED Triage Vitals [07/27/23 1838]   Temperature Pulse Respirations Blood Pressure SpO2   98.1 °F (36.7 °C) 86 12 134/79 96 %      Temp Source Heart Rate Source Patient Position - Orthostatic VS BP Location FiO2 (%)   Oral Monitor Lying Left arm --      Pain Score       No Pain           Vitals:    07/27/23 1838 07/27/23 1950   BP: 134/79 143/89   Pulse: 86 86   Patient Position - Orthostatic VS: Lying Lying         Visual Acuity  Visual Acuity    Flowsheet Row Most Recent Value   L Pupil Size (mm) 3   R Pupil Size (mm) 3          ED Medications  Medications   naloxone (FOR EMS ONLY) (NARCAN) 2 MG/2ML injection 2 mg (0 mg Does not apply Given to EMS 7/27/23 1842)   ondansetron (FOR EMS ONLY) (ZOFRAN) 4 mg/2 mL injection 4 mg (0 mg Does not apply Given to EMS 7/27/23 1842)       Diagnostic Studies  Results Reviewed     None                 No orders to display              Procedures  Procedures         ED Course  ED Course as of 07/27/23 1958   Thu Jul 27, 2023 1956 Patient awake and alert, ambulating in ED. Requesting to be discharged, patient walked out of ED prior to receiving discharge papers or Narcan to take with him. SBIRT 22yo+    Flowsheet Row Most Recent Value   Initial Alcohol Screen: US AUDIT-C     1. How often do you have a drink containing alcohol? 0 Filed at: 07/27/2023 1842   2. How many drinks containing alcohol do you have on a typical day you are drinking? 0 Filed at: 07/27/2023 1842   3a. Male UNDER 65: How often do you have five or more drinks on one occasion? 0 Filed at: 07/27/2023 1842   3b. FEMALE Any Age, or MALE 65+: How often do you have 4 or more drinks on one occassion? 0 Filed at: 07/27/2023 1842   Audit-C Score 0 Filed at: 07/27/2023 1842                    Medical Decision Making  59-year-old male, presents after possible overdose. Differential diagnosis includes opiate overdose, intoxication among other diagnoses. Patient resting comfortably, easily awakens to voice. Patient with normal respiratory effort. We will continue to monitor in ED. Amount and/or Complexity of Data Reviewed  Independent Historian: EMS      Risk  Prescription drug management. Disposition  Final diagnoses:   Accidental overdose, initial encounter     Time reflects when diagnosis was documented in both MDM as applicable and the Disposition within this note     Time User Action Codes Description Comment    7/27/2023  7:56 PM Dc Bal Accidental overdose, initial encounter       ED Disposition     ED Disposition   Discharge    Condition   Stable    Date/Time   Thu Jul 27, 2023  7:56 PM    Comment   Nabil Argueta discharge to home/self care. Follow-up Information    None         Patient's Medications    No medications on file       No discharge procedures on file.     PDMP Review     None          ED Provider  Electronically Signed by           Jade Clemente MD  07/27/23 Kristine Nicholson MD  07/27/23 1958

## 2023-08-02 ENCOUNTER — HOSPITAL ENCOUNTER (INPATIENT)
Facility: HOSPITAL | Age: 63
LOS: 2 days | Discharge: HOME | DRG: 469 | End: 2023-08-04
Attending: EMERGENCY MEDICINE | Admitting: INTERNAL MEDICINE
Payer: COMMERCIAL

## 2023-08-02 ENCOUNTER — APPOINTMENT (EMERGENCY)
Dept: CT IMAGING | Facility: HOSPITAL | Age: 63
DRG: 469 | End: 2023-08-02
Payer: COMMERCIAL

## 2023-08-02 DIAGNOSIS — E11.9 DIABETES MELLITUS (HCC): ICD-10-CM

## 2023-08-02 DIAGNOSIS — F17.200 NICOTINE DEPENDENCE: ICD-10-CM

## 2023-08-02 DIAGNOSIS — K52.9 GASTROENTERITIS: ICD-10-CM

## 2023-08-02 DIAGNOSIS — R73.9 HYPERGLYCEMIA: ICD-10-CM

## 2023-08-02 DIAGNOSIS — N17.9 AKI (ACUTE KIDNEY INJURY) (HCC): ICD-10-CM

## 2023-08-02 DIAGNOSIS — E87.1 HYPONATREMIA: Primary | ICD-10-CM

## 2023-08-02 PROBLEM — R10.9 ABDOMINAL PAIN: Status: ACTIVE | Noted: 2023-08-02

## 2023-08-02 PROBLEM — F19.10 SUBSTANCE ABUSE (HCC): Status: ACTIVE | Noted: 2023-08-02

## 2023-08-02 LAB
2HR DELTA HS TROPONIN: 1 NG/L
4HR DELTA HS TROPONIN: 5 NG/L
ALBUMIN SERPL BCP-MCNC: 4.7 G/DL (ref 3.5–5)
ALP SERPL-CCNC: 85 U/L (ref 34–104)
ALT SERPL W P-5'-P-CCNC: 45 U/L (ref 7–52)
AMPHETAMINES SERPL QL SCN: NEGATIVE
ANION GAP SERPL CALCULATED.3IONS-SCNC: 11 MMOL/L
ANION GAP SERPL CALCULATED.3IONS-SCNC: 15 MMOL/L
AST SERPL W P-5'-P-CCNC: 30 U/L (ref 13–39)
ATRIAL RATE: 89 BPM
BACTERIA UR QL AUTO: NORMAL /HPF
BARBITURATES UR QL: NEGATIVE
BASOPHILS # BLD AUTO: 0.01 THOUSANDS/ÂΜL (ref 0–0.1)
BASOPHILS NFR BLD AUTO: 0 % (ref 0–1)
BENZODIAZ UR QL: NEGATIVE
BILIRUB SERPL-MCNC: 1.79 MG/DL (ref 0.2–1)
BILIRUB UR QL STRIP: NEGATIVE
BUN SERPL-MCNC: 25 MG/DL (ref 5–25)
BUN SERPL-MCNC: 34 MG/DL (ref 5–25)
CALCIUM SERPL-MCNC: 10 MG/DL (ref 8.4–10.2)
CALCIUM SERPL-MCNC: 8.5 MG/DL (ref 8.4–10.2)
CARDIAC TROPONIN I PNL SERPL HS: 13 NG/L
CARDIAC TROPONIN I PNL SERPL HS: 8 NG/L
CARDIAC TROPONIN I PNL SERPL HS: 9 NG/L
CHLORIDE SERPL-SCNC: 76 MMOL/L (ref 96–108)
CHLORIDE SERPL-SCNC: 87 MMOL/L (ref 96–108)
CLARITY UR: CLEAR
CO2 SERPL-SCNC: 30 MMOL/L (ref 21–32)
CO2 SERPL-SCNC: 31 MMOL/L (ref 21–32)
COCAINE UR QL: NEGATIVE
COLOR UR: YELLOW
CREAT SERPL-MCNC: 1.1 MG/DL (ref 0.6–1.3)
CREAT SERPL-MCNC: 1.36 MG/DL (ref 0.6–1.3)
CREAT UR-MCNC: 100.1 MG/DL
EOSINOPHIL # BLD AUTO: 0.01 THOUSAND/ÂΜL (ref 0–0.61)
EOSINOPHIL NFR BLD AUTO: 0 % (ref 0–6)
ERYTHROCYTE [DISTWIDTH] IN BLOOD BY AUTOMATED COUNT: 12.3 % (ref 11.6–15.1)
EST. AVERAGE GLUCOSE BLD GHB EST-MCNC: 212 MG/DL
GFR SERPL CREATININE-BSD FRML MDRD: 54 ML/MIN/1.73SQ M
GFR SERPL CREATININE-BSD FRML MDRD: 71 ML/MIN/1.73SQ M
GLUCOSE SERPL-MCNC: 290 MG/DL (ref 65–140)
GLUCOSE SERPL-MCNC: 353 MG/DL (ref 65–140)
GLUCOSE UR STRIP-MCNC: ABNORMAL MG/DL
HBA1C MFR BLD: 9 %
HCT VFR BLD AUTO: 49.8 % (ref 36.5–49.3)
HGB BLD-MCNC: 17.8 G/DL (ref 12–17)
HGB UR QL STRIP.AUTO: 25
IMM GRANULOCYTES # BLD AUTO: 0.01 THOUSAND/UL (ref 0–0.2)
IMM GRANULOCYTES NFR BLD AUTO: 0 % (ref 0–2)
KETONES UR STRIP-MCNC: ABNORMAL MG/DL
LEUKOCYTE ESTERASE UR QL STRIP: NEGATIVE
LIPASE SERPL-CCNC: 21 U/L (ref 11–82)
LYMPHOCYTES # BLD AUTO: 1.18 THOUSANDS/ÂΜL (ref 0.6–4.47)
LYMPHOCYTES NFR BLD AUTO: 14 % (ref 14–44)
MCH RBC QN AUTO: 31.1 PG (ref 26.8–34.3)
MCHC RBC AUTO-ENTMCNC: 35.7 G/DL (ref 31.4–37.4)
MCV RBC AUTO: 87 FL (ref 82–98)
METHADONE UR QL: NEGATIVE
MONOCYTES # BLD AUTO: 1.06 THOUSAND/ÂΜL (ref 0.17–1.22)
MONOCYTES NFR BLD AUTO: 13 % (ref 4–12)
NEUTROPHILS # BLD AUTO: 5.91 THOUSANDS/ÂΜL (ref 1.85–7.62)
NEUTS SEG NFR BLD AUTO: 73 % (ref 43–75)
NITRITE UR QL STRIP: NEGATIVE
NON-SQ EPI CELLS URNS QL MICRO: NORMAL /HPF
NRBC BLD AUTO-RTO: 0 /100 WBCS
OPIATES UR QL SCN: NEGATIVE
OSMOLALITY UR/SERPL-RTO: 282 MMOL/KG (ref 282–298)
OSMOLALITY UR: 636 MMOL/KG
OSMOLALITY UR: 698 MMOL/KG
OXYCODONE+OXYMORPHONE UR QL SCN: NEGATIVE
P AXIS: 64 DEGREES
PCP UR QL: NEGATIVE
PH UR STRIP.AUTO: 5 [PH]
PLATELET # BLD AUTO: 254 THOUSANDS/UL (ref 149–390)
PLATELET # BLD AUTO: 254 THOUSANDS/UL (ref 149–390)
PMV BLD AUTO: 11.5 FL (ref 8.9–12.7)
PMV BLD AUTO: 11.5 FL (ref 8.9–12.7)
POTASSIUM SERPL-SCNC: 3.3 MMOL/L (ref 3.5–5.3)
POTASSIUM SERPL-SCNC: 3.5 MMOL/L (ref 3.5–5.3)
PR INTERVAL: 192 MS
PROT SERPL-MCNC: 7.9 G/DL (ref 6.4–8.4)
PROT UR STRIP-MCNC: ABNORMAL MG/DL
QRS AXIS: 147 DEGREES
QRSD INTERVAL: 150 MS
QT INTERVAL: 424 MS
QTC INTERVAL: 515 MS
RBC # BLD AUTO: 5.73 MILLION/UL (ref 3.88–5.62)
RBC #/AREA URNS AUTO: NORMAL /HPF
SODIUM 24H UR-SCNC: 15 MOL/L
SODIUM 24H UR-SCNC: 18 MOL/L
SODIUM SERPL-SCNC: 122 MMOL/L (ref 135–147)
SODIUM SERPL-SCNC: 128 MMOL/L (ref 135–147)
SP GR UR STRIP.AUTO: 1.02 (ref 1–1.04)
T WAVE AXIS: 35 DEGREES
THC UR QL: POSITIVE
UROBILINOGEN UA: NEGATIVE MG/DL
VENTRICULAR RATE: 89 BPM
WBC # BLD AUTO: 8.18 THOUSAND/UL (ref 4.31–10.16)
WBC #/AREA URNS AUTO: NORMAL /HPF

## 2023-08-02 PROCEDURE — 83690 ASSAY OF LIPASE: CPT | Performed by: EMERGENCY MEDICINE

## 2023-08-02 PROCEDURE — 84484 ASSAY OF TROPONIN QUANT: CPT | Performed by: EMERGENCY MEDICINE

## 2023-08-02 PROCEDURE — 83935 ASSAY OF URINE OSMOLALITY: CPT | Performed by: INTERNAL MEDICINE

## 2023-08-02 PROCEDURE — 85025 COMPLETE CBC W/AUTO DIFF WBC: CPT | Performed by: EMERGENCY MEDICINE

## 2023-08-02 PROCEDURE — 81001 URINALYSIS AUTO W/SCOPE: CPT | Performed by: EMERGENCY MEDICINE

## 2023-08-02 PROCEDURE — 99285 EMERGENCY DEPT VISIT HI MDM: CPT

## 2023-08-02 PROCEDURE — 36415 COLL VENOUS BLD VENIPUNCTURE: CPT | Performed by: EMERGENCY MEDICINE

## 2023-08-02 PROCEDURE — 99222 1ST HOSP IP/OBS MODERATE 55: CPT | Performed by: INTERNAL MEDICINE

## 2023-08-02 PROCEDURE — 96374 THER/PROPH/DIAG INJ IV PUSH: CPT

## 2023-08-02 PROCEDURE — 93005 ELECTROCARDIOGRAM TRACING: CPT

## 2023-08-02 PROCEDURE — 74177 CT ABD & PELVIS W/CONTRAST: CPT

## 2023-08-02 PROCEDURE — 80048 BASIC METABOLIC PNL TOTAL CA: CPT | Performed by: INTERNAL MEDICINE

## 2023-08-02 PROCEDURE — 84300 ASSAY OF URINE SODIUM: CPT | Performed by: INTERNAL MEDICINE

## 2023-08-02 PROCEDURE — 96361 HYDRATE IV INFUSION ADD-ON: CPT

## 2023-08-02 PROCEDURE — 96372 THER/PROPH/DIAG INJ SC/IM: CPT

## 2023-08-02 PROCEDURE — 83930 ASSAY OF BLOOD OSMOLALITY: CPT | Performed by: INTERNAL MEDICINE

## 2023-08-02 PROCEDURE — 99285 EMERGENCY DEPT VISIT HI MDM: CPT | Performed by: EMERGENCY MEDICINE

## 2023-08-02 PROCEDURE — NC001 PR NO CHARGE: Performed by: INTERNAL MEDICINE

## 2023-08-02 PROCEDURE — 82570 ASSAY OF URINE CREATININE: CPT | Performed by: INTERNAL MEDICINE

## 2023-08-02 PROCEDURE — 81003 URINALYSIS AUTO W/O SCOPE: CPT | Performed by: EMERGENCY MEDICINE

## 2023-08-02 PROCEDURE — 93010 ELECTROCARDIOGRAM REPORT: CPT

## 2023-08-02 PROCEDURE — 80307 DRUG TEST PRSMV CHEM ANLYZR: CPT

## 2023-08-02 PROCEDURE — 85049 AUTOMATED PLATELET COUNT: CPT | Performed by: INTERNAL MEDICINE

## 2023-08-02 PROCEDURE — 80053 COMPREHEN METABOLIC PANEL: CPT | Performed by: EMERGENCY MEDICINE

## 2023-08-02 PROCEDURE — 83036 HEMOGLOBIN GLYCOSYLATED A1C: CPT | Performed by: INTERNAL MEDICINE

## 2023-08-02 RX ORDER — CHLORPROMAZINE HYDROCHLORIDE 25 MG/ML
50 INJECTION INTRAMUSCULAR ONCE
Status: COMPLETED | OUTPATIENT
Start: 2023-08-02 | End: 2023-08-02

## 2023-08-02 RX ORDER — ONDANSETRON 2 MG/ML
4 INJECTION INTRAMUSCULAR; INTRAVENOUS EVERY 6 HOURS PRN
Status: DISCONTINUED | OUTPATIENT
Start: 2023-08-02 | End: 2023-08-04 | Stop reason: HOSPADM

## 2023-08-02 RX ORDER — POLYETHYLENE GLYCOL 3350 17 G/17G
17 POWDER, FOR SOLUTION ORAL DAILY PRN
Status: DISCONTINUED | OUTPATIENT
Start: 2023-08-02 | End: 2023-08-04 | Stop reason: HOSPADM

## 2023-08-02 RX ORDER — ONDANSETRON 2 MG/ML
4 INJECTION INTRAMUSCULAR; INTRAVENOUS ONCE
Status: COMPLETED | OUTPATIENT
Start: 2023-08-02 | End: 2023-08-02

## 2023-08-02 RX ORDER — ACETAMINOPHEN 325 MG/1
650 TABLET ORAL EVERY 6 HOURS PRN
Status: DISCONTINUED | OUTPATIENT
Start: 2023-08-02 | End: 2023-08-04 | Stop reason: HOSPADM

## 2023-08-02 RX ORDER — NICOTINE 21 MG/24HR
1 PATCH, TRANSDERMAL 24 HOURS TRANSDERMAL DAILY
Status: DISCONTINUED | OUTPATIENT
Start: 2023-08-02 | End: 2023-08-04 | Stop reason: HOSPADM

## 2023-08-02 RX ORDER — HEPARIN SODIUM 5000 [USP'U]/ML
5000 INJECTION, SOLUTION INTRAVENOUS; SUBCUTANEOUS EVERY 8 HOURS SCHEDULED
Status: DISCONTINUED | OUTPATIENT
Start: 2023-08-02 | End: 2023-08-04 | Stop reason: HOSPADM

## 2023-08-02 RX ORDER — SIMETHICONE 80 MG
80 TABLET,CHEWABLE ORAL 4 TIMES DAILY PRN
Status: DISCONTINUED | OUTPATIENT
Start: 2023-08-02 | End: 2023-08-04 | Stop reason: HOSPADM

## 2023-08-02 RX ADMIN — CHLORPROMAZINE HYDROCHLORIDE 50 MG: 25 INJECTION INTRAMUSCULAR at 12:40

## 2023-08-02 RX ADMIN — HEPARIN SODIUM 5000 UNITS: 5000 INJECTION INTRAVENOUS; SUBCUTANEOUS at 22:49

## 2023-08-02 RX ADMIN — HEPARIN SODIUM 5000 UNITS: 5000 INJECTION INTRAVENOUS; SUBCUTANEOUS at 16:01

## 2023-08-02 RX ADMIN — NICOTINE 1 PATCH: 14 PATCH, EXTENDED RELEASE TRANSDERMAL at 16:02

## 2023-08-02 RX ADMIN — SODIUM CHLORIDE 1000 ML: 0.9 INJECTION, SOLUTION INTRAVENOUS at 12:40

## 2023-08-02 RX ADMIN — IOHEXOL 100 ML: 350 INJECTION, SOLUTION INTRAVENOUS at 13:54

## 2023-08-02 RX ADMIN — SODIUM CHLORIDE 1000 ML: 0.9 INJECTION, SOLUTION INTRAVENOUS at 14:20

## 2023-08-02 RX ADMIN — ONDANSETRON 4 MG: 2 INJECTION INTRAMUSCULAR; INTRAVENOUS at 12:40

## 2023-08-02 NOTE — ED ATTENDING ATTESTATION
8/2/2023  IChucky MD, saw and evaluated the patient. I have discussed the patient with the resident/non-physician practitioner and agree with the resident's/non-physician practitioner's findings, Plan of Care, and MDM as documented in the resident's/non-physician practitioner's note, except where noted. All available labs and Radiology studies were reviewed. I was present for key portions of any procedure(s) performed by the resident/non-physician practitioner and I was immediately available to provide assistance. At this point I agree with the current assessment done in the Emergency Department. I have conducted an independent evaluation of this patient a history and physical is as follows:  57-year-old male. History of K2 abuse. Patient presents complaining of abdominal pain, nausea, vomiting, diarrhea, and hiccups. Symptoms have been ongoing for about 4 days. The diarrhea is improved. He believes symptoms started after working in an asbestosis contaminated/marin basement. He also reports drinking some bad milk. No fever or chills. Physical exam: There is mild tenderness to the epigastrium to the left lower quadrant. There are no peritoneal signs. Assessment/plan: Thorazine IM for hiccups. Zofran for nausea and vomiting. Hydration. EKG, labs, CAT scan, reevaluation.   ED Course         Critical Care Time  Procedures

## 2023-08-02 NOTE — H&P
[facility]  H&P  Name: Elen Lewis 61 y.o. male I MRN: [id number]  Unit/Bed#: ED 04 I Date of Admission: 8/2/2023   Date of Service: 8/2/2023 I Hospital Day: 0      Assessment/Plan   * Hyponatremia  Assessment & Plan  · POA, most likely due to hypovolemia hyponatremia   · sNA 122 on admission  · Already received 2 L IV NS in ED   · BMP every 6h for now  · Nephrology consult placed appreciate recommendations  · Follow urine sodium, osmolality and serum and osmolality    Hyperglycemia  Assessment & Plan  · Patient denies history of diabetes however patient has not seen PCP for many years   · No anion gap  · We will check hemoglobin A1c    Substance abuse ([address])  Assessment & Plan  · History of substance abuse and accidental overdose with K2, marijuana  · Admitted using K2 recently  · Follow UDS    Abdominal pain  Assessment & Plan  · Presented with nausea, vomiting and abdominal pain for 4 days, differential could be due to gastroenteritis, drug use  · Patient contributing to drinking spoiled milk  · admitted to using K2 recently, the last use was this morning  · History of accidental overdose and polysubstance use  · Diarrhea stopped yesterday  · CT A/P with contrast showed no acute intra-abdominal abnormality, scattered colonic diverticulosis without diverticulitis, borderline prostatomegaly  · Patient is afebrile, no leukocytosis  · Monitor clinically  · Check UDS    LUZ (acute kidney injury) ([address])  Assessment & Plan  · POA, creatinine 1.36 on admission  · No recent creatinine, creatinine in 02/2021 showed 0.8 6  · Received 2 L IV NS in ED  · Hold off IV fluid for now to avoid rapid correction of hyponatremia  · Follow BMP tomorrow  · Avoid nephrotoxic agent, hypotension  · LUZ protocol in place  · Follow urine sodium and creatinine for FeNa       VTE Prophylaxis: Heparin  / sequential compression device   Code Status: Full code  POLST: There is no POLST form on file for this patient (pre-hospital)  Discussion with family: Discussed with patient, patient declined calling a family member    Anticipated Length of Stay:  Patient will be admitted on an Inpatient basis with an anticipated length of stay of less than 2 midnights. Justification for Hospital Stay: LUZ, hyponatremia    Total Time for Visit, including Counseling / Coordination of Care: 45 minutes. Greater than 50% of this total time spent on direct patient counseling and coordination of care. Chief Complaint:   Nausea, vomiting, abdominal pain    History of Present Illness:    Sarita Srinivasan is a 61 y.o. male who presents with nausea, vomiting and abdominal pain for 4 days. Patient was contributing to drinking AsparMag. Patient admitted using K2 recently and the last use was this morning. Patient had history of accidental overdose including K2 and marijuana. Patient hiccup was resolved during my encounter. Patient is not very cooperative for physical exam. Patient said diarrhea stopped yesterday. He has not seen PCP for many years and denies any significant medical history. In ED vital signs are stable. No neurological changes. Review of Systems:    Review of Systems Patient was seen and examined at bedside. The patient has nausea, vomiting and abdominal pain which is currently improved. His hiccup was resolved. He denies any fever, chills, chest pain, palpitation, shortness of breath, N/V, abd pain. Past Medical and Surgical History:     History reviewed. No pertinent past medical history. History reviewed. No pertinent surgical history. Meds/Allergies:    Prior to Admission medications    Not on File     I have reviewed home medications with patient personally.     Allergies: No Known Allergies    Social History:     Marital Status: Single   Occupation: Patient did not answer  Patient Pre-hospital Living Situation: Patient did not answer  Patient Pre-hospital Level of Mobility: Independent  Patient Pre-hospital Diet Restrictions: No restrictions  Substance Use History:   Social History     Substance and Sexual Activity   Alcohol Use Yes     Social History     Tobacco Use   Smoking Status Heavy Smoker   • Packs/day: 0.50   • Types: Cigarettes   Smokeless Tobacco Never     Social History     Substance and Sexual Activity   Drug Use Yes    Comment: K2       Family History:    History reviewed. No pertinent family history. Physical Exam:     Vitals:   Blood Pressure: 157/91 (08/02/23 1343)  Pulse: (!) 108 (08/02/23 1343)  Temperature: (!) 96.4 °F (35.8 °C) (08/02/23 1208)  Temp Source: Tympanic (08/02/23 1208)  Respirations: 18 (08/02/23 1343)  Weight - Scale: 70.9 kg (156 lb 4.9 oz) (08/02/23 1208)  SpO2: 97 % (08/02/23 1343)    Physical Exam  General: no acute distress  HEENT: NC/AT, PERRL, EOM - normal, dry mouth  Neck: Supple  Pulm/Chest: Normal chest wall expansion, clear breath sounds on both side  CVS: RRR, normal S1&S2, capillary refill <2s  Abd: soft, non tender, non distended, bowel sounds +  MSK: move all 4 extremities spontaneously  Skin: warm  CNS: no acute focal neuro deficit      Additional Data:     Lab Results: I have personally reviewed pertinent reports. Results from last 7 days   Lab Units 08/02/23  1240   WBC Thousand/uL 8.18   HEMOGLOBIN g/dL 17.8*   HEMATOCRIT % 49.8*   PLATELETS Thousands/uL 254  254   NEUTROS PCT % 73   LYMPHS PCT % 14   MONOS PCT % 13*   EOS PCT % 0     Results from last 7 days   Lab Units 08/02/23  1240   SODIUM mmol/L 122*   POTASSIUM mmol/L 3.5   CHLORIDE mmol/L 76*   CO2 mmol/L 31   BUN mg/dL 34*   CREATININE mg/dL 1.36*   ANION GAP mmol/L 15   CALCIUM mg/dL 10.0   ALBUMIN g/dL 4.7   TOTAL BILIRUBIN mg/dL 1.79*   ALK PHOS U/L 85   ALT U/L 45   AST U/L 30   GLUCOSE RANDOM mg/dL 353*                       Imaging: I have personally reviewed pertinent reports.       CT abdomen pelvis with contrast   Final Result by Mulu Overton DO (08/02 1457)      No acute intra-abdominal abnormality. Scattered colonic diverticulosis without diverticulitis. Borderline prostatomegaly. Workstation performed: [id number]             EKG, Pathology, and Other Studies Reviewed on Admission:   · EKG: NSR, bifascicular block    Allscripts / Epic Records Reviewed: Yes     ** Please Note: This note has been constructed using a voice recognition system.  **

## 2023-08-02 NOTE — ED PROVIDER NOTES
History  Chief Complaint   Patient presents with   • Vomiting     Pt c/o nausea, vomiting and hiccups X 4 days. 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 He then had a glass of milk at home which he later found out the milk was spoiled. 62 yo male with no PMH presents today to ED due to vomiting, nausea, hx of diarrhea and hiccups for the past 4 days. Symptoms started on Saturday, according to patient, he was working on the basement when  He drank spoiled milk. He complains of epigastric pain, vomiting 3-4 times a day. Last BM was yesterday, admits on using K2 drugs, last use this morning. Patient denies fever, chills, no hematemesis, has not seen a PCP in many years. None       History reviewed. No pertinent past medical history. History reviewed. No pertinent surgical history. History reviewed. No pertinent family history. I have reviewed and agree with the history as documented. E-Cigarette/Vaping   • E-Cigarette Use Never User      E-Cigarette/Vaping Substances     Social History     Tobacco Use   • Smoking status: Heavy Smoker     Packs/day: 0.50     Types: Cigarettes   • Smokeless tobacco: Never   Vaping Use   • Vaping Use: Never used   Substance Use Topics   • Alcohol use: Yes   • Drug use: Yes     Comment: K2        Review of Systems   Constitutional: Negative for chills, diaphoresis and fever. HENT: Negative for congestion, ear pain, sore throat and tinnitus. Eyes: Negative for pain and visual disturbance. Respiratory: Negative for cough, shortness of breath and wheezing. Cardiovascular: Negative for chest pain and palpitations. Gastrointestinal: Positive for abdominal pain, diarrhea, nausea and vomiting. Negative for blood in stool. Genitourinary: Negative for dysuria and hematuria. Musculoskeletal: Negative for arthralgias and back pain. Skin: Negative for color change and rash.    Neurological: Negative for seizures and syncope. All other systems reviewed and are negative. Physical Exam  ED Triage Vitals [08/02/23 1208]   Temperature Pulse Respirations Blood Pressure SpO2   (!) 96.4 °F (35.8 °C) 86 18 158/52 96 %      Temp Source Heart Rate Source Patient Position - Orthostatic VS BP Location FiO2 (%)   Tympanic Monitor Lying Left arm --      Pain Score       --             Orthostatic Vital Signs  Vitals:    08/02/23 1208   BP: 158/52   Pulse: 86   Patient Position - Orthostatic VS: Lying       Physical Exam  Vitals and nursing note reviewed. Constitutional:       General: He is not in acute distress. Appearance: He is well-developed. HENT:      Head: Normocephalic and atraumatic. Right Ear: External ear normal.      Left Ear: External ear normal.      Nose: No rhinorrhea. Mouth/Throat:      Mouth: Mucous membranes are dry. Eyes:      General: No scleral icterus. Conjunctiva/sclera: Conjunctivae normal.   Cardiovascular:      Rate and Rhythm: Normal rate and regular rhythm. Heart sounds: No murmur heard. Pulmonary:      Effort: Pulmonary effort is normal. No respiratory distress. Breath sounds: Normal breath sounds. No wheezing. Abdominal:      General: Bowel sounds are normal. There is no distension. Palpations: Abdomen is soft. There is no mass. Tenderness: There is abdominal tenderness in the right lower quadrant and epigastric area. There is no guarding. Musculoskeletal:         General: No swelling. Cervical back: Neck supple. Skin:     General: Skin is warm and dry. Capillary Refill: Capillary refill takes less than 2 seconds. Coloration: Skin is not jaundiced. Neurological:      Mental Status: He is alert and oriented to person, place, and time.    Psychiatric:         Mood and Affect: Mood normal.         ED Medications  Medications   chlorproMAZINE (THORAZINE) injection SOLN 50 mg (has no administration in time range)   ondansetron (ZOFRAN) injection 4 mg (has no administration in time range)   sodium chloride 0.9 % bolus 1,000 mL (has no administration in time range)       Diagnostic Studies  Results Reviewed     Procedure Component Value Units Date/Time    CBC and differential [[id number]]     Lab Status: No result Specimen: Blood     Comprehensive metabolic panel [[id number]]     Lab Status: No result Specimen: Blood     Lipase [[id number]]     Lab Status: No result Specimen: Blood     UA w Reflex to Microscopic w Reflex to Culture [[id number]]     Lab Status: No result Specimen: Urine                  CT abdomen pelvis with contrast    (Results Pending)         Procedures  Procedures      ED Course  ED Course as of 08/02/23 1525   Wed Aug 02, 2023   1509 CT abdomen pelvis with contrast   1510 CT-scan with contrast shows mild diverticulosis with no signs of diverticulitis, normal appendix. SBIRT 20yo+    Flowsheet Row Most Recent Value   Initial Alcohol Screen: US AUDIT-C     1. How often do you have a drink containing alcohol? 0 Filed at: 08/02/2023 1213   2. How many drinks containing alcohol do you have on a typical day you are drinking? 0 Filed at: 08/02/2023 1213   3a. Male UNDER 65: How often do you have five or more drinks on one occasion? 0 Filed at: 08/02/2023 1213   3b. FEMALE Any Age, or MALE 65+: How often do you have 4 or more drinks on one occassion? 0 Filed at: 08/02/2023 1213   Audit-C Score 0 Filed at: 08/02/2023 1213   MARIA C: How many times in the past year have you. .. Used an illegal drug or used a prescription medication for non-medical reasons? Daily or Almost Daily Filed at: 08/02/2023 1213   DAST-10: In the past 12 months. ..    1. Have you used drugs other than those required for medical reasons? 1 Filed at: 08/02/2023 1213   2. Do you use more than one drug at a time? 0 Filed at: 08/02/2023 1213   3.  Have you had medical problems as a result of your drug use (e.g., memory loss, hepatitis, convulsions, bleeding, etc.)? 0 Filed at: 08/02/2023 1213   4. Have you had "blackouts" or "flashbacks" as a result of drug use? YesNo 0 Filed at: 08/02/2023 1213   5. Do you ever feel bad or guilty about your drug use? 1 Filed at: 08/02/2023 1213   6. Does your spouse (or parent) ever complain about your involvement with drugs? 1 Filed at: 08/02/2023 1213   7. Have you neglected your family because of your use of drugs? 0 Filed at: 08/02/2023 1213   8. Have you engaged in illegal activities in order to obtain drugs? 0 Filed at: 08/02/2023 1213   9. Have you ever experienced withdrawal symptoms (felt sick) when you stopped taking drugs? 0 Filed at: 08/02/2023 1213   10. Are you always able to stop using drugs when you want to? 1 Filed at: 08/02/2023 1213   DAST-10 Score 4 Filed at: 08/02/2023 1213                Medical Decision Making  61 y.o. with no PMH, complains of vomiting, diarrhea, hiccups for 4 days. Abdominal tenderness to palpation. No fever, chills, last BM normal consistency, yesterday. Chlorproethazine for hiccups , IV fluid for dehydration   Abnormal EKG with RBBB no acute ST changes. Troponin to rule out MI., troponin at time 0 and 2h are normal.    CMP , CBC, UA, CT scan with IV contrast.    Differential:gastroenteritis vs appendicitis vs diverticulitis vs obstructive bowel disease. Labs remarkable for hyponatremia ,LUZ probably due to dehydration, ketones in urine. CT scan unremarkable. Normal appendix, no signs of diverticulitis. UDS pending. Amount and/or Complexity of Data Reviewed  Labs: ordered. Radiology: ordered. Decision-making details documented in ED Course. Risk  Prescription drug management. Decision regarding hospitalization. Disposition  Final diagnoses:   None     ED Disposition     None      Follow-up Information    None         Patient's Medications    No medications on file     No discharge procedures on file.     PDMP Review     None ED Provider  Attending physically available and evaluated Stuart Andrews. I managed the patient along with the ED Attending.     Electronically Signed by         Sally Suarez MD  08/02/23 9618

## 2023-08-02 NOTE — ASSESSMENT & PLAN NOTE
· POA, creatinine 1.36 on admission  · No recent creatinine, creatinine in 02/2021 showed 0.8 6  · Received 2 L IV NS in ED  · Hold off IV fluid for now to avoid rapid correction of hyponatremia  · Follow BMP tomorrow  · Avoid nephrotoxic agent, hypotension  · LUZ protocol in place  · Follow urine sodium and creatinine for FeNa

## 2023-08-02 NOTE — ASSESSMENT & PLAN NOTE
· Patient denies history of diabetes however patient has not seen PCP for many years   · No anion gap  · We will check hemoglobin A1c

## 2023-08-02 NOTE — ASSESSMENT & PLAN NOTE
· POA, most likely due to hypovolemia hyponatremia   · sNA 122 on admission  · Already received 2 L IV NS in ED   · BMP every 6h for now  · Nephrology consult placed appreciate recommendations  · Follow urine sodium, osmolality and serum and osmolality

## 2023-08-02 NOTE — PLAN OF CARE
Problem: METABOLIC, FLUID AND ELECTROLYTES - ADULT  Goal: Electrolytes maintained within normal limits  Description: INTERVENTIONS:  - Monitor labs and assess patient for signs and symptoms of electrolyte imbalances  - Administer electrolyte replacement as ordered  - Monitor response to electrolyte replacements, including repeat lab results as appropriate  - Instruct patient on fluid and nutrition as appropriate  Outcome: Progressing  Goal: Fluid balance maintained  Description: INTERVENTIONS:  - Monitor labs   - Monitor I/O and WT  - Instruct patient on fluid and nutrition as appropriate  - Assess for signs & symptoms of volume excess or deficit  Outcome: Progressing     Problem: Knowledge Deficit  Goal: Patient/family/caregiver demonstrates understanding of disease process, treatment plan, medications, and discharge instructions  Description: Complete learning assessment and assess knowledge base.   Interventions:  - Provide teaching at level of understanding  - Provide teaching via preferred learning methods  Outcome: Progressing     Problem: DISCHARGE PLANNING  Goal: Discharge to home or other facility with appropriate resources  Description: INTERVENTIONS:  - Identify barriers to discharge w/patient and caregiver  - Arrange for needed discharge resources and transportation as appropriate  - Identify discharge learning needs (meds, wound care, etc.)  - Arrange for interpretive services to assist at discharge as needed  - Refer to Case Management Department for coordinating discharge planning if the patient needs post-hospital services based on physician/advanced practitioner order or complex needs related to functional status, cognitive ability, or social support system  Outcome: Progressing

## 2023-08-02 NOTE — ASSESSMENT & PLAN NOTE
· History of substance abuse and accidental overdose with K2, marijuana  · Admitted using K2 recently  · Follow UDS

## 2023-08-02 NOTE — PROGRESS NOTES
Reviewed chart  Consult for hyponatremia   Corrected sodium given glucose 126  S/p 2 L saline  Repeat labs and urine studies pending   Goal approx 132 in am

## 2023-08-02 NOTE — ASSESSMENT & PLAN NOTE
· Presented with nausea, vomiting and abdominal pain for 4 days, differential could be due to gastroenteritis, drug use  · Patient contributing to drinking spoiled milk  · admitted to using K2 recently, the last use was this morning  · History of accidental overdose and polysubstance use  · Diarrhea stopped yesterday  · CT A/P with contrast showed no acute intra-abdominal abnormality, scattered colonic diverticulosis without diverticulitis, borderline prostatomegaly  · Patient is afebrile, no leukocytosis  · Monitor clinically  · Check UDS

## 2023-08-03 PROBLEM — E87.6 HYPOKALEMIA: Status: ACTIVE | Noted: 2023-08-03

## 2023-08-03 PROBLEM — E87.6 HYPOKALEMIA: Status: RESOLVED | Noted: 2023-08-03 | Resolved: 2023-08-03

## 2023-08-03 PROBLEM — N17.9 AKI (ACUTE KIDNEY INJURY) (HCC): Status: RESOLVED | Noted: 2023-08-02 | Resolved: 2023-08-03

## 2023-08-03 LAB
ANION GAP SERPL CALCULATED.3IONS-SCNC: 10 MMOL/L
ANION GAP SERPL CALCULATED.3IONS-SCNC: 11 MMOL/L
ANION GAP SERPL CALCULATED.3IONS-SCNC: 12 MMOL/L
ANION GAP SERPL CALCULATED.3IONS-SCNC: 7 MMOL/L
BUN SERPL-MCNC: 15 MG/DL (ref 5–25)
BUN SERPL-MCNC: 18 MG/DL (ref 5–25)
BUN SERPL-MCNC: 20 MG/DL (ref 5–25)
BUN SERPL-MCNC: 22 MG/DL (ref 5–25)
CALCIUM SERPL-MCNC: 8.2 MG/DL (ref 8.4–10.2)
CALCIUM SERPL-MCNC: 8.2 MG/DL (ref 8.4–10.2)
CALCIUM SERPL-MCNC: 8.4 MG/DL (ref 8.4–10.2)
CALCIUM SERPL-MCNC: 8.5 MG/DL (ref 8.4–10.2)
CHLORIDE SERPL-SCNC: 87 MMOL/L (ref 96–108)
CHLORIDE SERPL-SCNC: 89 MMOL/L (ref 96–108)
CHLORIDE SERPL-SCNC: 89 MMOL/L (ref 96–108)
CHLORIDE SERPL-SCNC: 90 MMOL/L (ref 96–108)
CO2 SERPL-SCNC: 29 MMOL/L (ref 21–32)
CO2 SERPL-SCNC: 29 MMOL/L (ref 21–32)
CO2 SERPL-SCNC: 30 MMOL/L (ref 21–32)
CO2 SERPL-SCNC: 32 MMOL/L (ref 21–32)
CORTIS AM PEAK SERPL-MCNC: 20.4 UG/DL (ref 6.7–22.6)
CREAT SERPL-MCNC: 0.94 MG/DL (ref 0.6–1.3)
CREAT SERPL-MCNC: 1 MG/DL (ref 0.6–1.3)
CREAT SERPL-MCNC: 1.01 MG/DL (ref 0.6–1.3)
CREAT SERPL-MCNC: 1.03 MG/DL (ref 0.6–1.3)
ERYTHROCYTE [DISTWIDTH] IN BLOOD BY AUTOMATED COUNT: 12.4 % (ref 11.6–15.1)
GFR SERPL CREATININE-BSD FRML MDRD: 76 ML/MIN/1.73SQ M
GFR SERPL CREATININE-BSD FRML MDRD: 78 ML/MIN/1.73SQ M
GFR SERPL CREATININE-BSD FRML MDRD: 79 ML/MIN/1.73SQ M
GFR SERPL CREATININE-BSD FRML MDRD: 85 ML/MIN/1.73SQ M
GLUCOSE SERPL-MCNC: 188 MG/DL (ref 65–140)
GLUCOSE SERPL-MCNC: 238 MG/DL (ref 65–140)
GLUCOSE SERPL-MCNC: 253 MG/DL (ref 65–140)
GLUCOSE SERPL-MCNC: 272 MG/DL (ref 65–140)
GLUCOSE SERPL-MCNC: 280 MG/DL (ref 65–140)
HCT VFR BLD AUTO: 42.8 % (ref 36.5–49.3)
HGB BLD-MCNC: 14.9 G/DL (ref 12–17)
MCH RBC QN AUTO: 31.1 PG (ref 26.8–34.3)
MCHC RBC AUTO-ENTMCNC: 34.8 G/DL (ref 31.4–37.4)
MCV RBC AUTO: 89 FL (ref 82–98)
PLATELET # BLD AUTO: 173 THOUSANDS/UL (ref 149–390)
PMV BLD AUTO: 11.9 FL (ref 8.9–12.7)
POTASSIUM SERPL-SCNC: 3.1 MMOL/L (ref 3.5–5.3)
POTASSIUM SERPL-SCNC: 3.2 MMOL/L (ref 3.5–5.3)
POTASSIUM SERPL-SCNC: 3.4 MMOL/L (ref 3.5–5.3)
POTASSIUM SERPL-SCNC: 3.7 MMOL/L (ref 3.5–5.3)
RBC # BLD AUTO: 4.79 MILLION/UL (ref 3.88–5.62)
SODIUM SERPL-SCNC: 127 MMOL/L (ref 135–147)
SODIUM SERPL-SCNC: 129 MMOL/L (ref 135–147)
SODIUM SERPL-SCNC: 129 MMOL/L (ref 135–147)
SODIUM SERPL-SCNC: 130 MMOL/L (ref 135–147)
WBC # BLD AUTO: 6.14 THOUSAND/UL (ref 4.31–10.16)

## 2023-08-03 PROCEDURE — 99232 SBSQ HOSP IP/OBS MODERATE 35: CPT | Performed by: INTERNAL MEDICINE

## 2023-08-03 PROCEDURE — 80048 BASIC METABOLIC PNL TOTAL CA: CPT | Performed by: INTERNAL MEDICINE

## 2023-08-03 PROCEDURE — 99255 IP/OBS CONSLTJ NEW/EST HI 80: CPT | Performed by: INTERNAL MEDICINE

## 2023-08-03 PROCEDURE — 82533 TOTAL CORTISOL: CPT | Performed by: INTERNAL MEDICINE

## 2023-08-03 PROCEDURE — 85027 COMPLETE CBC AUTOMATED: CPT | Performed by: INTERNAL MEDICINE

## 2023-08-03 PROCEDURE — 82948 REAGENT STRIP/BLOOD GLUCOSE: CPT

## 2023-08-03 RX ORDER — POTASSIUM CHLORIDE 20 MEQ/1
40 TABLET, EXTENDED RELEASE ORAL ONCE
Status: COMPLETED | OUTPATIENT
Start: 2023-08-03 | End: 2023-08-03

## 2023-08-03 RX ORDER — SODIUM CHLORIDE, SODIUM GLUCONATE, SODIUM ACETATE, POTASSIUM CHLORIDE, MAGNESIUM CHLORIDE, SODIUM PHOSPHATE, DIBASIC, AND POTASSIUM PHOSPHATE .53; .5; .37; .037; .03; .012; .00082 G/100ML; G/100ML; G/100ML; G/100ML; G/100ML; G/100ML; G/100ML
100 INJECTION, SOLUTION INTRAVENOUS CONTINUOUS
Status: DISCONTINUED | OUTPATIENT
Start: 2023-08-03 | End: 2023-08-04 | Stop reason: HOSPADM

## 2023-08-03 RX ORDER — INSULIN LISPRO 100 [IU]/ML
1-5 INJECTION, SOLUTION INTRAVENOUS; SUBCUTANEOUS
Status: DISCONTINUED | OUTPATIENT
Start: 2023-08-03 | End: 2023-08-04 | Stop reason: HOSPADM

## 2023-08-03 RX ADMIN — HEPARIN SODIUM 5000 UNITS: 5000 INJECTION INTRAVENOUS; SUBCUTANEOUS at 05:52

## 2023-08-03 RX ADMIN — INSULIN LISPRO 2 UNITS: 100 INJECTION, SOLUTION INTRAVENOUS; SUBCUTANEOUS at 16:26

## 2023-08-03 RX ADMIN — SODIUM CHLORIDE, SODIUM GLUCONATE, SODIUM ACETATE, POTASSIUM CHLORIDE, MAGNESIUM CHLORIDE, SODIUM PHOSPHATE, DIBASIC, AND POTASSIUM PHOSPHATE 100 ML/HR: .53; .5; .37; .037; .03; .012; .00082 INJECTION, SOLUTION INTRAVENOUS at 14:29

## 2023-08-03 RX ADMIN — NICOTINE 1 PATCH: 14 PATCH, EXTENDED RELEASE TRANSDERMAL at 08:00

## 2023-08-03 RX ADMIN — HEPARIN SODIUM 5000 UNITS: 5000 INJECTION INTRAVENOUS; SUBCUTANEOUS at 14:28

## 2023-08-03 RX ADMIN — HEPARIN SODIUM 5000 UNITS: 5000 INJECTION INTRAVENOUS; SUBCUTANEOUS at 22:03

## 2023-08-03 RX ADMIN — POTASSIUM CHLORIDE 40 MEQ: 1500 TABLET, EXTENDED RELEASE ORAL at 22:03

## 2023-08-03 RX ADMIN — POTASSIUM CHLORIDE 40 MEQ: 1500 TABLET, EXTENDED RELEASE ORAL at 09:52

## 2023-08-03 NOTE — ASSESSMENT & PLAN NOTE
· POA, creatinine 1.36 on admission most likely due to volume depletion  · FeNa 0.2% indicating prerenal component  · improved to Cr 1 with IV fluid resuscitation  · No recent creatinine, creatinine in 02/2021 showed 0.86  · Received 2 L IV NS in ED  · Currently on IV Isolyte at 100 cc/hour  · Avoid nephrotoxic agent, hypotension  · LUZ protocol in place

## 2023-08-03 NOTE — ASSESSMENT & PLAN NOTE
· Presented with nausea, vomiting and abdominal pain for 4 days, differential could be due to gastroenteritis, drug use  · Patient contributing to drinking spoiled milk  · admitted to using K2 recently, the last use was this morning  · History of accidental overdose and polysubstance use  · Diarrhea stopped yesterday  · CT A/P with contrast showed no acute intra-abdominal abnormality, scattered colonic diverticulosis without diverticulitis, borderline prostatomegaly  · Patient is afebrile, no leukocytosis  · Monitor clinically

## 2023-08-03 NOTE — ASSESSMENT & PLAN NOTE
· Patient denies history of diabetes however patient has not seen PCP for many years   · No anion gap  · Blood sugar is persistently elevated.   hemoglobin A1c 9  · Started on sliding scale insulin and diabetic diet  · Plan to be discharged on metformin 500 mg twice daily and follow-up with PCP

## 2023-08-03 NOTE — UTILIZATION REVIEW
Initial Clinical Review    Admission: Date/Time/Statement:   Admission Orders (From admission, onward)     Ordered        08/02/23 1514  INPATIENT ADMISSION  Once                      Orders Placed This Encounter   Procedures   • INPATIENT ADMISSION     Standing Status:   Standing     Number of Occurrences:   1     Order Specific Question:   Level of Care     Answer:   Med Surg [16]     Order Specific Question:   Estimated length of stay     Answer:   More than 2 Midnights     Order Specific Question:   Certification     Answer:   I certify that inpatient services are medically necessary for this patient for a duration of greater than two midnights. See H&P and MD Progress Notes for additional information about the patient's course of treatment. ED Arrival Information     Expected   -    Arrival   8/2/2023 12:07    Acuity   Urgent            Means of arrival   Ambulance    Escorted by   [facility] ([address] [place] [address])    Service   Hospitalist    Admission type   Emergency            Arrival complaint   abdominal pain/vomiting           Chief Complaint   Patient presents with   • Vomiting     Pt c/o nausea, vomiting and hiccups X 4 days. 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 He then had a glass of milk at home which he later found out the milk was spoiled. Initial Presentation: 61 y.o. male who presented by EMS to [facility] ED. Inpatient admission for evaluation and treatment of hyponatremia. Presented w/ n/v/d and abdominal pain for 4 days. On exam, dry mucous membranes, RLQ and epigastric tenderness. Na 122. Plan: IVF, check BMP q6h, check urine sodium/osmolality and serum osmolality; check HgbA1c, check UDS, Trend labs, replete electrolytes as needed; supportive care. Nephrology consulted. Date: 08/03/23   Day 2: Denies new symptoms. On exam, move extremities spontaneously.  Urine sodium <20 indicating extrarenal loss which is consistent with n/v history. HgbA1c 9. Plan: BMP q6h, SSI w/ BG checks ACHS, will d/c on metformin, supportive care, Trend labs, replete electrolytes as needed; IVF. Nephrology: Pt w/ initial sodium of 122 although corrected at 126 given serum glucose of 353. Was given saline with significant improvement in sodium to 130 this morning. However most recent sodium level now down to 127. Resume IVF, q6h BMP. ED Triage Vitals   Temperature Pulse Respirations Blood Pressure SpO2   08/02/23 1208 08/02/23 1208 08/02/23 1208 08/02/23 1208 08/02/23 1208   (!) 96.4 °F (35.8 °C) 86 18 158/52 96 %      Temp Source Heart Rate Source Patient Position - Orthostatic VS BP Location FiO2 (%)   08/02/23 1208 08/02/23 1208 08/02/23 1208 08/02/23 1208 --   Tympanic Monitor Lying Left arm       Pain Score       08/02/23 1603       No Pain          Wt Readings from Last 1 Encounters:   08/03/23 72.3 kg (159 lb 6.3 oz)     Additional Vital Signs:   Date/Time Temp Pulse Resp BP MAP (mmHg) SpO2 O2 Device   08/03/23 0741 -- -- -- 150/90 -- -- --   08/03/23 0734 98.4 °F (36.9 °C) 100 18 168/105 Abnormal  120 98 % None (Room air)   08/02/23 1628 96.8 °F (36 °C) Abnormal  115 Abnormal  18 171/89 Abnormal  -- 98 % None (Room air)   08/02/23 1603 98 °F (36.7 °C) 100 18 180/89 Abnormal  -- 96 % None (Room air)   08/02/23 1343 -- 108 Abnormal  18 157/91 -- 97 % None (Room air)     Pertinent Labs/Diagnostic Test Results:   CT abdomen pelvis with contrast   Final Result by Zack Foote DO (08/02 1457)      No acute intra-abdominal abnormality. Scattered colonic diverticulosis without diverticulitis. Borderline prostatomegaly.             Workstation performed: [id number]               Results from last 7 days   Lab Units 08/03/23  0536 08/02/23  1240   WBC Thousand/uL 6.14 8.18   HEMOGLOBIN g/dL 14.9 17.8*   HEMATOCRIT % 42.8 49.8*   PLATELETS Thousands/uL 173 254  254   NEUTROS ABS Thousands/µL  --  5.91         Results from last 7 days   Lab Units 08/03/23  0536 08/03/23  0036 08/02/23 1951 08/02/23  1240   SODIUM mmol/L 130* 129* 128* 122*   POTASSIUM mmol/L 3.1* 3.2* 3.3* 3.5   CHLORIDE mmol/L 89* 89* 87* 76*   CO2 mmol/L 29 29 30 31   ANION GAP mmol/L 12 11 11 15   BUN mg/dL 20 22 25 34*   CREATININE mg/dL 1.01 1.03 1.10 1.36*   EGFR ml/min/1.73sq m 78 76 71 54   CALCIUM mg/dL 8.4 8.2* 8.5 10.0     Results from last 7 days   Lab Units 08/02/23  1240   AST U/L 30   ALT U/L 45   ALK PHOS U/L 85   TOTAL PROTEIN g/dL 7.9   ALBUMIN g/dL 4.7   TOTAL BILIRUBIN mg/dL 1.79*         Results from last 7 days   Lab Units 08/03/23  0536 08/03/23  0036 08/02/23 1951 08/02/23  1240   GLUCOSE RANDOM mg/dL 253* 272* 290* 353*     Results from last 7 days   Lab Units 08/02/23  1447   OSMOLALITY, SERUM mmol/     Results from last 7 days   Lab Units 08/02/23  1240   HEMOGLOBIN A1C % 9.0*   EAG mg/dl 212       Results from last 7 days   Lab Units 08/02/23 1728 08/02/23  1447 08/02/23  1252   HS TNI 0HR ng/L  --   --  8   HS TNI 2HR ng/L  --  9  --    HSTNI D2 ng/L  --  1  --    HS TNI 4HR ng/L 13  --   --    HSTNI D4 ng/L 5  --   --      Results from last 7 days   Lab Units 08/02/23  1240   LIPASE u/L 21             Results from last 7 days   Lab Units 08/02/23 1728 08/02/23  1447 08/02/23  1240   OSMOLALITY, SERUM mmol/KG  --  282  --    OSMO UR mmol/  --  698     Results from last 7 days   Lab Units 08/02/23 1728 08/02/23  1240   CLARITY UA   --  Clear   COLOR UA   --  Yellow   SPEC GRAV UA   --  1.020   PH UA   --  5.0   GLUCOSE UA mg/dl  --  >=1000 (1%)*   KETONES UA mg/dl  --  15 (1+)*   BLOOD UA   --  25.0*   PROTEIN UA mg/dl  --  30 (1+)*   NITRITE UA   --  Negative   BILIRUBIN UA   --  Negative   UROBILINOGEN UA mg/dL  --  Negative   LEUKOCYTES UA   --  Negative   WBC UA /hpf  --  0-1   RBC UA /hpf  --  0-1   BACTERIA UA /hpf  --  Occasional   EPITHELIAL CELLS WET PREP /hpf  --  Occasional   SODIUM UR  18 15   CREATININE UR mg/dL  -- 100.1             Results from last 7 days   Lab Units 08/02/23  1240   AMPH/METH  Negative   BARBITURATE UR  Negative   BENZODIAZEPINE UR  Negative   COCAINE UR  Negative   METHADONE URINE  Negative   OPIATE UR  Negative   PCP UR  Negative   THC UR  Positive*         ED Treatment:   Medication Administration from 08/02/2023 1207 to 08/02/2023 1619       Date/Time Order Dose Route Action     08/02/2023 1240 EDT chlorproMAZINE (THORAZINE) injection SOLN 50 mg 50 mg Intramuscular Given     08/02/2023 1240 EDT ondansetron (ZOFRAN) injection 4 mg 4 mg Intravenous Given     08/02/2023 1240 EDT sodium chloride 0.9 % bolus 1,000 mL 1,000 mL Intravenous New Bag     08/02/2023 1420 EDT sodium chloride 0.9 % bolus 1,000 mL 1,000 mL Intravenous New Bag     08/02/2023 1354 EDT iohexol (OMNIPAQUE) 350 MG/ML injection (SINGLE-DOSE) 100 mL 100 mL Intravenous Given     08/02/2023 1602 EDT nicotine (NICODERM CQ) 14 mg/24hr TD 24 hr patch 1 patch 1 patch Transdermal Medication Applied     08/02/2023 1601 EDT heparin (porcine) subcutaneous injection 5,000 Units 5,000 Units Subcutaneous Given          Admitting Diagnosis: Gastroenteritis [K52.9]  Hyponatremia [E87.1]  Vomiting [R11.10]  Hyperglycemia [R73.9]  LUZ (acute kidney injury) ([address]) [N17.9]  Age/Sex: 61 y.o. male  Admission Orders:  Regular Diet. DW. I&O.  SCDs. Scheduled Medications:  heparin (porcine), 5,000 Units, Subcutaneous, Q8H [address]  nicotine, 1 patch, Transdermal, Daily    Continuous IV Infusions: none     PRN Meds:  acetaminophen, 650 mg, Oral, Q6H PRN  ondansetron, 4 mg, Intravenous, Q6H PRN  polyethylene glycol, 17 g, Oral, Daily PRN  simethicone, 80 mg, Oral, 4x Daily PRN        IP CONSULT TO NEPHROLOGY    Network Utilization Review Department  ATTENTION: Please call with any questions or concerns to [phone] and carefully listen to the prompts so that you are directed to the right person.  All voicemails are confidential.  Fernando Dang all requests for admission clinical reviews, approved or denied determinations and any other requests to dedicated fax number below belonging to the campus where the patient is receiving treatment.  List of dedicated fax numbers for the Facilities:  [place] DENIALS (Administrative/Medical Necessity) [phone] [address] (Maternity/NICU/Pediatrics) [phone]   [address] [place] [address] [phone]   [facility] [address] [facility] [phone] 150[address] [facility] [address] [facility] [address] [place] [address] [phone]   [address] [place] [address] [address] [address] [facility] [phone]

## 2023-08-03 NOTE — CONSULTS
Consultation - Nephrology   Baltazra Byrd 61 y.o. male MRN: [id number]  Unit/Bed#: 7T [facility] 716-01 Encounter: [id number]      Assessment/Plan:  1. Hyponatremia, suspecting prerenal component plus hyperglycemia. Urine sodium: 15 and most recently 19. Urine osmolality 636. A.m. cortisol normal at 20  2. Acute kidney injury (present on admission (overall has resolved with IV fluids/volume placement  3. Hyperglycemia, management as per primary service. Hemoglobin A1c consistent with likely underlying diabetes at 9.0.  4. Abdominal pain with associated nausea and vomiting. Overall appears to have resolved. CT evidence without acute findings. Plan:  · Initial sodium of 122 although corrected at 126 given serum glucose of 353. Was given saline with significant improvement in sodium to 130 this morning. However most recent sodium level now down to 127. · Recommend resuming IV fluids  · Continue with every 6 hour laboratory studies  · Blood sugar management as per primary service. History of Present Illness   Physician Requesting Consult: Marie Multani MD  Reason for Consult / Principal Problem: Hyponatremia/acute kidney injury  HPI: Baltazar Byrd is a 61y.o. year old male who presents with abdominal pain. Patient is a 71-year-old male who presents with nausea vomiting abdominal pain for approximately 4 days. Patient noted to using K2 recently with an apparent history of accidental overdose. Again had complained of transient abdominal discomfort nausea vomiting. Using , patient states that his abdominal pain is resolved. Appetite has improved. No further discomfort. Denies any chest pain shortness of breath or significant swelling. History obtained from chart review and the patient    Review of Systems   Constitutional: Positive for appetite change and fatigue. Respiratory: Negative for chest tightness and shortness of breath.     Cardiovascular: Negative for chest pain and leg swelling. Gastrointestinal: Positive for abdominal pain, nausea and vomiting. Genitourinary: Negative for difficulty urinating. Psychiatric/Behavioral: Positive for agitation. Pertinent findings of a 10 point review of systems noted above otherwise all others negative    Historical Information   Patient Active Problem List   Diagnosis   • Hyponatremia   • LUZ (acute kidney injury) ([address])   • Abdominal pain   • Substance abuse ([address])   • Hyperglycemia   • Hypokalemia     History reviewed. No pertinent past medical history. History reviewed. No pertinent surgical history. Social History   Social History     Substance and Sexual Activity   Alcohol Use Yes     Social History     Substance and Sexual Activity   Drug Use Yes    Comment: K2     Social History     Tobacco Use   Smoking Status Heavy Smoker   • Packs/day: 0.50   • Types: Cigarettes   Smokeless Tobacco Never     History reviewed. No pertinent family history.     Meds/Allergies   current meds:   Current Facility-Administered Medications   Medication Dose Route Frequency   • acetaminophen (TYLENOL) tablet 650 mg  650 mg Oral Q6H PRN   • heparin (porcine) subcutaneous injection 5,000 Units  5,000 Units Subcutaneous Q8H [address]   • insulin lispro (HumaLOG) 100 units/mL subcutaneous injection 1-5 Units  1-5 Units Subcutaneous TID AC   • multi-electrolyte (PLASMALYTE-A/ISOLYTE-S PH 7.4) IV solution  100 mL/hr Intravenous Continuous   • nicotine (NICODERM CQ) 14 mg/24hr TD 24 hr patch 1 patch  1 patch Transdermal Daily   • ondansetron (ZOFRAN) injection 4 mg  4 mg Intravenous Q6H PRN   • polyethylene glycol (MIRALAX) packet 17 g  17 g Oral Daily PRN   • simethicone (MYLICON) chewable tablet 80 mg  80 mg Oral 4x Daily PRN       No Known Allergies      Objective   /90 (BP Location: Right arm)   Pulse 100   Temp 98.4 °F (36.9 °C) (Temporal)   Resp 18   Ht 5' 11" (1.803 m)   Wt 72.3 kg (159 lb 6.3 oz)   SpO2 98%   BMI 22.23 kg/m² Intake/Output Summary (Last 24 hours) at 8/3/2023 1350  Last data filed at 8/3/2023 0844  Gross per 24 hour   Intake 1480 ml   Output 201 ml   Net 1279 ml       Current Weight: Weight - Scale: 72.3 kg (159 lb 6.3 oz)    Physical Exam  Constitutional:       Appearance: He is not ill-appearing. Eyes:      General: No scleral icterus. Cardiovascular:      Rate and Rhythm: Normal rate and regular rhythm. Pulmonary:      [place]ff[place]: Pulmonary effort is normal.      Breath sounds: Normal breath sounds. Abdominal:      General: There is no distension. Palpations: Abdomen is soft. Tenderness: There is no abdominal tenderness. Musculoskeletal:         General: No deformity. Right lower leg: No edema. Left lower leg: No edema. Lymphadenopathy:      Cervical: No cervical adenopathy (No gross adenopathy noted). Skin:     General: Skin is warm and dry. Findings: No rash. Neurological:      Mental Status: He is alert and oriented to person, place, and time.            Lab Results:    Results from last 7 days   Lab Units 08/03/23  0536   WBC Thousand/uL 6.14   HEMOGLOBIN g/dL 14.9   HEMATOCRIT % 42.8   PLATELETS Thousands/uL 173     Results from last 7 days   Lab Units 08/03/23  1258   POTASSIUM mmol/L 3.7   CHLORIDE mmol/L 87*   CO2 mmol/L 30   BUN mg/dL 18   CREATININE mg/dL 1.00   CALCIUM mg/dL 8.5

## 2023-08-03 NOTE — PROGRESS NOTES
[address] [facility]  Progress Note  Name: Chiara Armstrong  MRN: [id number]  Unit/Bed#: 7T [facility] 716-01 I Date of Admission: 8/2/2023   Date of Service: 8/3/2023 I Hospital Day: 1    Assessment/Plan   * Hyponatremia  Assessment & Plan  · POA, most likely due to hypovolemia hyponatremia   · sNA 122 on admission, improved to 130 (corrected Na 132) this morning after IV NS 2 L   · However sodium dropped to 127 (corrected 130) on recent labs  · Urine sodium <20 indicating extrarenal loss which is consistent with his nausea and vomiting history  · Nephrologist on board -appreciate recommendations. Recommended Isolyte at 100 cc/hour  · BMP every 6h for now      Hyperglycemia  Assessment & Plan  · Patient denies history of diabetes however patient has not seen PCP for many years   · No anion gap  · Blood sugar is persistently elevated.   hemoglobin A1c 9  · Started on sliding scale insulin and diabetic diet  · Plan to be discharged on metformin 500 mg twice daily and follow-up with PCP    Substance abuse ([address])  Assessment & Plan  · History of substance abuse and accidental overdose with K2, marijuana  · Admitted using K2 recently  · UDS positive for marijuana    Abdominal pain  Assessment & Plan  · Presented with nausea, vomiting and abdominal pain for 4 days, differential could be due to gastroenteritis, drug use  · Patient contributing to drinking spoiled milk  · admitted to using K2 recently, the last use was this morning  · History of accidental overdose and polysubstance use  · Diarrhea stopped yesterday  · CT A/P with contrast showed no acute intra-abdominal abnormality, scattered colonic diverticulosis without diverticulitis, borderline prostatomegaly  · Patient is afebrile, no leukocytosis  · Monitor clinically      Hypokalemia-resolved as of 8/3/2023  Assessment & Plan  · K 3.7  · Replace with p.o. KCl 40 mEq  · Follow BMP    LUZ (acute kidney injury) (HCC)-resolved as of 8/3/2023  Assessment & Plan  · POA, creatinine 1.36 on admission most likely due to volume depletion  · FeNa 0.2% indicating prerenal component  · improved to Cr 1 with IV fluid resuscitation  · No recent creatinine, creatinine in 2021 showed 0.86  · Received 2 L IV NS in ED  · Currently on IV Isolyte at 100 cc/hour  · Avoid nephrotoxic agent, hypotension  · LUZ protocol in place           VTE Pharmacologic Prophylaxis:   Pharmacologic: Heparin  Mechanical VTE Prophylaxis in Place: Yes    Patient Centered Rounds: I have performed bedside rounds with nursing staff today. Discussions with Specialists or Other Care Team Provider: Discussed with , RN    Education and Discussions with Family / Patient: Discussed with patient    Time Spent for Care: 35 minutes. This time was spent on one or more of the following: performing physical exam; counseling and coordination of care; obtaining or reviewing history; documenting in the medical record; reviewing/ordering tests, medications or procedures; communicating with other healthcare professionals and discussing with patient's family/caregivers. Current Length of Stay: 1 day(s)    Current Patient Status: Inpatient   Certification Statement: The patient will continue to require additional inpatient hospital stay due to Hyponatremia    Discharge Plan / Estimated Discharge Date: Pending clinical course      Code Status: Level 1 - Full Code      Subjective:   Patient was seen and examined at bedside. The patient denies any pain, headache, blurry vision, chest pain, palpitation, shortness of breath, N/V, abd pain. Objective:     Vitals:   Temp (24hrs), Av.7 °F (36.5 °C), Min:96.8 °F (36 °C), Max:98.4 °F (36.9 °C)    Temp:  [96.8 °F (36 °C)-98.4 °F (36.9 °C)] 98.4 °F (36.9 °C)  HR:  [100-115] 100  Resp:  [18] 18  BP: (150-180)/() 150/90  SpO2:  [96 %-98 %] 98 %  Body mass index is 22.23 kg/m².      Input and Output Summary (last 24 hours): Intake/Output Summary (Last 24 hours) at 8/3/2023 1436  Last data filed at 8/3/2023 0844  Gross per 24 hour   Intake 1480 ml   Output 201 ml   Net 1279 ml       Physical Exam:     Physical Exam  General: no acute distress  HEENT: NC/AT, PERRL, EOM - normal  Neck: Supple  Pulm/Chest: Normal chest wall expansion, clear breath sounds on both side  CVS: RRR, normal S1&S2, capillary refill <2s  Abd: soft, non tender, non distended, bowel sounds +  MSK: move all 4 extremities spontaneously  Skin: warm  CNS: no acute focal neuro deficit      Additional Data:     Labs:    Results from last 7 days   Lab Units 08/03/23  0536 08/02/23  1240   WBC Thousand/uL 6.14 8.18   HEMOGLOBIN g/dL 14.9 17.8*   HEMATOCRIT % 42.8 49.8*   PLATELETS Thousands/uL 173 254  254   NEUTROS PCT %  --  73   LYMPHS PCT %  --  14   MONOS PCT %  --  13*   EOS PCT %  --  0     Results from last 7 days   Lab Units 08/03/23  1258 08/02/23  1951 08/02/23  1240   POTASSIUM mmol/L 3.7   < > 3.5   CHLORIDE mmol/L 87*   < > 76*   CO2 mmol/L 30   < > 31   BUN mg/dL 18   < > 34*   CREATININE mg/dL 1.00   < > 1.36*   CALCIUM mg/dL 8.5   < > 10.0   ALK PHOS U/L  --   --  85   ALT U/L  --   --  45   AST U/L  --   --  30    < > = values in this interval not displayed. * I Have Reviewed All Lab Data Listed Above. * Additional Pertinent Lab Tests Reviewed: [address] [facility] Admission Reviewed    Imaging:      I have reviewed pertinent imaging.       Recent Cultures (last 7 days):           Last 24 Hours Medication List:   Current Facility-Administered Medications   Medication Dose Route Frequency Provider Last Rate   • acetaminophen  650 mg Oral Q6H PRN Jennifer Fonseca MD     • heparin (porcine)  5,000 Units Subcutaneous [facility] Jennifer Fonseca MD     • insulin lispro  1-5 Units Subcutaneous TID AC Jennifer Fonseca MD     • multi-electrolyte  100 mL/hr Intravenous Continuous Lorrane [place] DO Unruly 100 mL/hr (08/03/23 1426)   • nicotine  1 patch Transdermal Daily Ildefonso Madrid MD     • ondansetron  4 mg Intravenous Q6H PRN Ildefonso Madrid MD     • polyethylene glycol  17 g Oral Daily PRN Ildefonso Madrid MD     • simethicone  80 mg Oral 4x Daily PRN Ildefonso Madrid MD          Today, Patient Was Seen By: Ildefonso Madrid MD    ** Please Note: Dragon 360 Dictation voice to text software may have been used in the creation of this document.  **

## 2023-08-03 NOTE — ASSESSMENT & PLAN NOTE
· POA, most likely due to hypovolemia hyponatremia   · sNA 122 on admission, improved to 130 (corrected Na 132) this morning after IV NS 2 L   · However sodium dropped to 127 (corrected 130) on recent labs  · Urine sodium <20 indicating extrarenal loss which is consistent with his nausea and vomiting history  · Nephrologist on board -appreciate recommendations.   Recommended Isolyte at 100 cc/hour  · BMP every 6h for now

## 2023-08-03 NOTE — CASE MANAGEMENT
Case Management Discharge Planning Note    Patient name Niko Jon  Location 7T /7T [facility] 682-80 MRN [id number]  : 1960 Date 8/3/2023       Current Admission Date: 2023  Current Admission Diagnosis:Hyponatremia   Patient Active Problem List    Diagnosis Date Noted   • Hypokalemia 2023   • Hyponatremia 2023   • LUZ (acute kidney injury) ([address]) 2023   • Abdominal pain 2023   • Substance abuse ([address]) 2023   • Hyperglycemia 2023      LOS (days): 1  Geometric Mean LOS (GMLOS) (days):   Days to GMLOS:     OBJECTIVE:  Risk of Unplanned Readmission Score: 16.53     Current admission status: Inpatient   Preferred Pharmacy:   [address] [place] [address]  Phone: [phone] Fax: [phone]    Primary Care Provider: No primary care provider on file. Primary Insurance: [place] [employer]  Secondary Insurance:     DISCHARGE DETAILS:    Discharge planning discussed with[de-identified] Patient  Freedom of Choice: Yes  Comments - Freedom of Choice: would like to go home    Were Treatment Team discharge recommendations reviewed with patient/caregiver?: Yes  Did patient/caregiver verbalize understanding of patient care needs?: Yes  Were patient/caregiver advised of the risks associated with not following Treatment Team discharge recommendations?: Yes     Additional Comments: Cm spoke with patient using  IPAD. He denied use of drugs or alcohol and does not want any HOST services. No needs noted at this time.

## 2023-08-03 NOTE — ASSESSMENT & PLAN NOTE
· History of substance abuse and accidental overdose with K2, marijuana  · Admitted using K2 recently  · UDS positive for marijuana

## 2023-08-03 NOTE — PLAN OF CARE
Problem: PAIN - ADULT  Goal: Verbalizes/displays adequate comfort level or baseline comfort level  Description: Interventions:  - Encourage patient to monitor pain and request assistance  - Assess pain using appropriate pain scale  - Administer analgesics based on type and severity of pain and evaluate response  - Implement non-pharmacological measures as appropriate and evaluate response  - Consider cultural and social influences on pain and pain management  - Notify physician/advanced practitioner if interventions unsuccessful or patient reports new pain  Outcome: Progressing     Problem: SAFETY ADULT  Goal: Patient will remain free of falls  Description: INTERVENTIONS:  - Educate patient/family on patient safety including physical limitations  - Instruct patient to call for assistance with activity   - Consult OT/PT to assist with strengthening/mobility   - Keep Call bell within reach  - Keep bed low and locked with side rails adjusted as appropriate  - Keep care items and personal belongings within reach  - Initiate and maintain comfort rounds  - Make Fall Risk Sign visible to staff  - Apply yellow socks and bracelet for high fall risk patients  - Consider moving patient to room near nurses station  Outcome: Progressing  Goal: Maintain or return to baseline ADL function  Description: INTERVENTIONS:  -  Assess patient's ability to carry out ADLs; assess patient's baseline for ADL function and identify physical deficits which impact ability to perform ADLs (bathing, care of mouth/teeth, toileting, grooming, dressing, etc.)  - Assess/evaluate cause of self-care deficits   - Assess range of motion  - Assess patient's mobility; develop plan if impaired  - Assess patient's need for assistive devices and provide as appropriate  - Encourage maximum independence but intervene and supervise when necessary  - Involve family in performance of ADLs  - Assess for home care needs following discharge   - Consider OT consult to assist with ADL evaluation and planning for discharge  - Provide patient education as appropriate  Outcome: Progressing  Goal: Maintains/Returns to pre admission functional level  Description: INTERVENTIONS:  - Perform BMAT or MOVE assessment daily.   - Set and communicate daily mobility goal to care team and patient/family/caregiver. - Collaborate with rehabilitation services on mobility goals if consulted  - Out of bed for toileting  - Record patient progress and toleration of activity level   Outcome: Progressing     Problem: DISCHARGE PLANNING  Goal: Discharge to home or other facility with appropriate resources  Description: INTERVENTIONS:  - Identify barriers to discharge w/patient and caregiver  - Arrange for needed discharge resources and transportation as appropriate  - Identify discharge learning needs (meds, wound care, etc.)  - Arrange for interpretive services to assist at discharge as needed  - Refer to Case Management Department for coordinating discharge planning if the patient needs post-hospital services based on physician/advanced practitioner order or complex needs related to functional status, cognitive ability, or social support system  Outcome: Progressing     Problem: Knowledge Deficit  Goal: Patient/family/caregiver demonstrates understanding of disease process, treatment plan, medications, and discharge instructions  Description: Complete learning assessment and assess knowledge base.   Interventions:  - Provide teaching at level of understanding  - Provide teaching via preferred learning methods  Outcome: Progressing     Problem: METABOLIC, FLUID AND ELECTROLYTES - ADULT  Goal: Electrolytes maintained within normal limits  Description: INTERVENTIONS:  - Monitor labs and assess patient for signs and symptoms of electrolyte imbalances  - Administer electrolyte replacement as ordered  - Monitor response to electrolyte replacements, including repeat lab results as appropriate  - Instruct patient on fluid and nutrition as appropriate  Outcome: Progressing  Goal: Fluid balance maintained  Description: INTERVENTIONS:  - Monitor labs   - Monitor I/O and WT  - Instruct patient on fluid and nutrition as appropriate  - Assess for signs & symptoms of volume excess or deficit  Outcome: Progressing

## 2023-08-04 VITALS
BODY MASS INDEX: 22.69 KG/M2 | WEIGHT: 162.04 LBS | SYSTOLIC BLOOD PRESSURE: 165 MMHG | OXYGEN SATURATION: 99 % | DIASTOLIC BLOOD PRESSURE: 87 MMHG | HEART RATE: 87 BPM | TEMPERATURE: 96.6 F | RESPIRATION RATE: 18 BRPM | HEIGHT: 71 IN

## 2023-08-04 PROBLEM — E11.9 DIABETES MELLITUS (HCC): Status: ACTIVE | Noted: 2023-08-02

## 2023-08-04 PROBLEM — R10.9 ABDOMINAL PAIN: Status: RESOLVED | Noted: 2023-08-02 | Resolved: 2023-08-04

## 2023-08-04 LAB
ANION GAP SERPL CALCULATED.3IONS-SCNC: 8 MMOL/L
ANION GAP SERPL CALCULATED.3IONS-SCNC: 9 MMOL/L
BUN SERPL-MCNC: 13 MG/DL (ref 5–25)
BUN SERPL-MCNC: 14 MG/DL (ref 5–25)
CALCIUM SERPL-MCNC: 8 MG/DL (ref 8.4–10.2)
CALCIUM SERPL-MCNC: 8 MG/DL (ref 8.4–10.2)
CHLORIDE SERPL-SCNC: 90 MMOL/L (ref 96–108)
CHLORIDE SERPL-SCNC: 92 MMOL/L (ref 96–108)
CO2 SERPL-SCNC: 30 MMOL/L (ref 21–32)
CO2 SERPL-SCNC: 31 MMOL/L (ref 21–32)
CREAT SERPL-MCNC: 0.84 MG/DL (ref 0.6–1.3)
CREAT SERPL-MCNC: 0.9 MG/DL (ref 0.6–1.3)
GFR SERPL CREATININE-BSD FRML MDRD: 90 ML/MIN/1.73SQ M
GFR SERPL CREATININE-BSD FRML MDRD: 93 ML/MIN/1.73SQ M
GLUCOSE SERPL-MCNC: 204 MG/DL (ref 65–140)
GLUCOSE SERPL-MCNC: 209 MG/DL (ref 65–140)
GLUCOSE SERPL-MCNC: 214 MG/DL (ref 65–140)
POTASSIUM SERPL-SCNC: 3.6 MMOL/L (ref 3.5–5.3)
POTASSIUM SERPL-SCNC: 3.7 MMOL/L (ref 3.5–5.3)
SODIUM SERPL-SCNC: 129 MMOL/L (ref 135–147)
SODIUM SERPL-SCNC: 131 MMOL/L (ref 135–147)

## 2023-08-04 PROCEDURE — 82948 REAGENT STRIP/BLOOD GLUCOSE: CPT

## 2023-08-04 PROCEDURE — 80048 BASIC METABOLIC PNL TOTAL CA: CPT | Performed by: INTERNAL MEDICINE

## 2023-08-04 PROCEDURE — 99239 HOSP IP/OBS DSCHRG MGMT >30: CPT | Performed by: INTERNAL MEDICINE

## 2023-08-04 RX ORDER — NICOTINE 21 MG/24HR
1 PATCH, TRANSDERMAL 24 HOURS TRANSDERMAL DAILY
Qty: 28 PATCH | Refills: 0 | Status: SHIPPED | OUTPATIENT
Start: 2023-08-04

## 2023-08-04 RX ADMIN — SODIUM CHLORIDE, SODIUM GLUCONATE, SODIUM ACETATE, POTASSIUM CHLORIDE, MAGNESIUM CHLORIDE, SODIUM PHOSPHATE, DIBASIC, AND POTASSIUM PHOSPHATE 100 ML/HR: .53; .5; .37; .037; .03; .012; .00082 INJECTION, SOLUTION INTRAVENOUS at 00:06

## 2023-08-04 RX ADMIN — NICOTINE 1 PATCH: 14 PATCH, EXTENDED RELEASE TRANSDERMAL at 08:13

## 2023-08-04 RX ADMIN — INSULIN LISPRO 1 UNITS: 100 INJECTION, SOLUTION INTRAVENOUS; SUBCUTANEOUS at 08:15

## 2023-08-04 RX ADMIN — SODIUM CHLORIDE, SODIUM GLUCONATE, SODIUM ACETATE, POTASSIUM CHLORIDE, MAGNESIUM CHLORIDE, SODIUM PHOSPHATE, DIBASIC, AND POTASSIUM PHOSPHATE 100 ML/HR: .53; .5; .37; .037; .03; .012; .00082 INJECTION, SOLUTION INTRAVENOUS at 08:13

## 2023-08-04 RX ADMIN — HEPARIN SODIUM 5000 UNITS: 5000 INJECTION INTRAVENOUS; SUBCUTANEOUS at 05:37

## 2023-08-04 NOTE — ASSESSMENT & PLAN NOTE
· Presented with nausea, vomiting and abdominal pain for 4 days, differential could be due to gastroenteritis, drug use  · Patient contributing to drinking spoiled milk  · admitted to using K2 recently, the last use was that morning on admission day  · History of accidental overdose and polysubstance use  · Diarrhea stopped.   Patient denies any abdominal pain  · CT A/P with contrast showed no acute intra-abdominal abnormality, scattered colonic diverticulosis without diverticulitis, borderline prostatomegaly  · Patient is afebrile, no leukocytosis  · Monitor clinically

## 2023-08-04 NOTE — DISCHARGE SUMMARY
[address] [facility]  Discharge- Tara Pill 1960, 61 y.o. male MRN: [id number]  Unit/Bed#: 7T [facility] 716-01 Encounter: [id number]  Primary Care Provider: No primary care provider on file. Date and time admitted to hospital: 8/2/2023 12:07 PM    * Hyponatremia  Assessment & Plan  · POA, most likely due to hypovolemia hyponatremia   · sNA improved from 127-131 after IV Isolyte  · Urine sodium <20 indicating extrarenal loss which is consistent with his nausea and vomiting history  · Nephrologist on board -appreciate recommendations. · Patient is agitated and asking to leave this morning. Discussed with nephrologist -patient can go home with outpatient BMP next week. · Patient is recommended to follow-up with his PCP        Diabetes mellitus [facility])  Assessment & Plan  · Patient denies history of diabetes however patient has not seen PCP for many years   · No anion gap  · Blood sugar is persistently elevated. hemoglobin A1c 9  · Started on sliding scale insulin and diabetic diet  · Plan to be discharged on metformin 500 mg twice daily and follow-up with PCP    Substance abuse ([address] )  Assessment & Plan  · History of substance abuse and accidental overdose with K2, marijuana  · Admitted using K2 recently  · UDS positive for marijuana    Hypokalemia-resolved as of 8/3/2023  Assessment & Plan  · K 3.7  · Replace with p.o. KCl 40 mEq  · Follow BMP    Abdominal pain-resolved as of 8/4/2023  Assessment & Plan  · Presented with nausea, vomiting and abdominal pain for 4 days, differential could be due to gastroenteritis, drug use  · Patient contributing to drinking spoiled milk  · admitted to using K2 recently, the last use was that morning on admission day  · History of accidental overdose and polysubstance use  · Diarrhea stopped.   Patient denies any abdominal pain  · CT A/P with contrast showed no acute intra-abdominal abnormality, scattered colonic diverticulosis without diverticulitis, borderline prostatomegaly  · Patient is afebrile, no leukocytosis  · Monitor clinically      LUZ (acute kidney injury) (HCC)-resolved as of 8/3/2023  Assessment & Plan  · POA, creatinine 1.36 on admission most likely due to volume depletion  · FeNa 0.2% indicating prerenal component  · improved to Cr 1 with IV fluid resuscitation, currently creatinine 0.84  · No recent creatinine, creatinine in 02/2021 showed 0.86  · Received 2 L IV NS in ED  · Currently on IV Isolyte at 100 cc/hour  · Avoid nephrotoxic agent, hypotension  · LUZ protocol in place      Discharging Physician / Practitioner: Oxana Capps MD  PCP: No primary care provider on file. Admission Date:   Admission Orders (From admission, onward)     Ordered        08/02/23 [address]  Once                      Discharge Date: 08/04/23    Medical Problems     Resolved Problems  Date Reviewed: 8/4/2023          Resolved    LUZ (acute kidney injury) ([address]) 8/3/2023     Resolved by  Oxana Capps MD    Abdominal pain 8/4/2023     Resolved by  Oxana Capsp MD    Hypokalemia 8/3/2023     Resolved by  Oxana Capps MD          Consultations During Hospital Stay:  · Nephrologist    Procedures Performed:   · None    Significant Findings / Test Results:   CT abdomen pelvis with contrast Result Date: 8/2/2023  · Impression: No acute intra-abdominal abnormality. Scattered colonic diverticulosis without diverticulitis. Borderline prostatomegaly. Workstation performed: [id number]       Incidental Findings:   · See above    Test Results Pending at Discharge (will require follow up): · None     Outpatient Tests Requested:  · Outpatient BMP next week    Complications: None    Reason for Admission: Low sodium level, LUZ    Hospital Course:     Joan Sterling is a 61 y.o. male patient with PMH of polysubstance abuse who originally presented to the hospital on 8/2/2023 due to nausea vomiting and abdominal pain.   CT abdomen pelvis was unremarkable. Patient symptoms improved during hospitalization with supportive care. Patient sodium level improved after IV NS and IV Isolyte and resuscitation. LUZ resolved with IV fluid resuscitation as well. Initial plan was to wait for this afternoon's sodium level however patient was agitated and asking to leave this morning. Discussed with nephrologist - patient is clear for discharge and recommended outpatient BMP next week. Patient is recommended to follow-up with his PCP next week and discuss about BMP. Patient is clinically and hemodynamically stable to be discharged today. Please see above list of diagnoses and related plan for additional information. Condition at Discharge: stable     Discharge Day Visit / Exam:     Subjective:  Patient was seen and examined at bedside. The patient denies any pain, headache, blurry vision, chest pain, palpitation, shortness of breath, N/V, abd pain. Patient is agitated and asking to leave this morning. Vitals: Blood Pressure: 165/87 (08/04/23 0706)  Pulse: 87 (08/04/23 0706)  Temperature: (!) 96.6 °F (35.9 °C) (08/04/23 0706)  Temp Source: Temporal (08/04/23 0706)  Respirations: 18 (08/04/23 0706)  Height: 5' 11" (180.3 cm) (08/02/23 1628)  Weight - Scale: 73.5 kg (162 lb 0.6 oz) (08/04/23 0600)  SpO2: 99 % (08/04/23 0706)  Exam:   Physical Exam  General: no acute distress  HEENT: NC/AT, PERRL, EOM - normal  Neck: Supple  Pulm/Chest: Normal chest wall expansion, clear breath sounds on both side  CVS: RRR, normal S1&S2, capillary refill <2s  Abd: soft, non tender, non distended, bowel sounds +  MSK: move all 4 extremities spontaneously  Skin: warm  CNS: no acute focal neuro deficit    Discussion with Family: Patient declined calling a family member    Discharge instructions/Information to patient and family:   See after visit summary for information provided to patient and family.       Provisions for Follow-Up Care:  See after visit summary for information related to follow-up care and any pertinent home health orders. Disposition:     Home    For Discharges to [facility] SNF:   · Not Applicable to this Patient - Not Applicable to this Patient    Planned Readmission: No     Discharge Statement:  I spent 45 minutes discharging the patient. This time was spent on the day of discharge. I had direct contact with the patient on the day of discharge. Greater than 50% of the total time was spent examining patient, answering all patient questions, arranging and discussing plan of care with patient as well as directly providing post-discharge instructions. Additional time then spent on discharge activities. Discharge Medications:  See after visit summary for reconciled discharge medications provided to patient and family.       ** Please Note: This note has been constructed using a voice recognition system **

## 2023-08-04 NOTE — DISCHARGE INSTR - AVS FIRST PAGE
Discharge instructions from hospitalist  1. Follow-up with your primary care physician in 1 week in regards to recent hospitalization. Please get BMP next week and follow-up with your PCP. 2. Take medications regularly    3. Come back to the ER if symptoms recur or worsen  4. Activity as tolerated  5.  Diet :  Heart healthy diet; information packet has more detailed information

## 2023-08-04 NOTE — ASSESSMENT & PLAN NOTE
· POA, creatinine 1.36 on admission most likely due to volume depletion  · FeNa 0.2% indicating prerenal component  · improved to Cr 1 with IV fluid resuscitation, currently creatinine 0.84  · No recent creatinine, creatinine in 02/2021 showed 0.86  · Received 2 L IV NS in ED  · Currently on IV Isolyte at 100 cc/hour  · Avoid nephrotoxic agent, hypotension  · LUZ protocol in place

## 2023-08-04 NOTE — ASSESSMENT & PLAN NOTE
· POA, most likely due to hypovolemia hyponatremia   · sNA improved from 127-131 after IV Isolyte  · Urine sodium <20 indicating extrarenal loss which is consistent with his nausea and vomiting history  · Nephrologist on board -appreciate recommendations. · Patient is agitated and asking to leave this morning. Discussed with nephrologist -patient can go home with outpatient BMP next week.    · Patient is recommended to follow-up with his PCP

## 2023-08-04 NOTE — NURSING NOTE
Patient discharged to home, IV removed. Discharge instructions given to patient with stated understanding. Patient left unit in stable condition with belongings.

## 2023-08-07 ENCOUNTER — TELEPHONE (OUTPATIENT)
Dept: NEPHROLOGY | Facility: CLINIC | Age: 63
End: 2023-08-07

## 2023-08-07 DIAGNOSIS — E87.1 HYPONATREMIA: Primary | ICD-10-CM
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
## 2023-08-07 NOTE — TELEPHONE ENCOUNTER
Called patient and left a voicemail stating the following information:    Recent admission to [facility]. Diagnosis hyponatremia. Recommend repeat BMP next week. Follow-up will be based upon repeat laboratory studies. I asked the patient please call back with further questions. Labs have been ordered.

## 2023-08-07 NOTE — PROGRESS NOTES
Name: Satish Griffin      : 1960      MRN: [id number]  Encounter Provider: Matthew Donahue MD  Encounter Date: 2023   Encounter department: [address] [facility],[address]     1. Type 2 diabetes mellitus without complication, without long-term current use of insulin ([facility])  Assessment & Plan:  -Patient was recently diagnosed with Diabetes Mellitus type 2 during recent hospitalization due to hypochloremic hyponatremic metabolic acidosis - vomiting and nausea. - Patient cant re-call the last time he went to see a health provider before this admission  - Denied nausea , vomiting, polyuria, polydipsia, + polyphagia  - Diabetes education - lifestyle modifications    Lab Results   Component Value Date    HGBA1C 9.0 (H) 2023     Plan:  -Continue metformin 500 mg 2 times daily (patient was not being compliant, agreed to start taking it. - Close follow up , will see him next week for diabetes education  - Pending retinopathy screening, pneumococcal vaccine  -Need to start him on statin next week, for this week he will start taking biguanide. Orders:  -     Lipid panel; Future  -     Albumin / creatinine urine ratio; Future  -     metFORMIN (GLUCOPHAGE) 1000 MG tablet; Take 0.5 tablets (500 mg total) by mouth 2 (two) times a day with meals    2.  Hyponatremia  Assessment & Plan:  Lab Results   Component Value Date    SODIUM 131 (L) 2023    K 3.7 2023    CL 92 (L) 2023    CO2 30 2023    AGAP 9 2023    BUN 13 2023    CREATININE 0.84 2023    GLUC 204 (H) 2023    CALCIUM 8.0 (L) 2023    AST 30 2023    ALT 45 2023    ALKPHOS 85 2023    TP 7.9 2023    TBILI 1.79 (H) 2023    EGFR 93 2023     -Patient had an order for CMP yesterday but didn't go , last Na was 131, patient denied nausea, heeadache, confusion, fatigue, muscle spasms.  - He agreed to come tomorrow morning for blood work. Plan:   BMP   Follow up next week      Orders:  -     Basic metabolic panel; Future    3. Screening examination for STD (sexually transmitted disease)  Assessment & Plan:  Px requested STD screening  Refers 2 sexual partners lifetime  Plan:  HIV  RPR  Hepatitis C  Hepatitis B panel  Ch/Gn urine      Orders:  -     RPR-Syphilis Screening (Total Syphilis IGG/IGM); Future  -     Hepatitis C antibody; Future  -     : HIV 1/2 AB/AG w Reflex [facility] for 2 yr old and above; Future  -     Hepatitis B Immunity Panel; Future  -     Chlamydia/Gonococcus/Genital Mycoplasma Profile, COURTNEY, Urine    4. Substance abuse ([address])  Assessment & Plan:  -Referred he used K2 few months before his hospitalization.  -Has admissions for unintentional overdose - heroin  -Patient denies use of opioids or other drugs  Plan:  - Narcan , patient has 4 nasal sprays  - Counseling  - Follow up next week        Depression Screening and Follow-up Plan: Patient was screened for depression during today's encounter. They screened negative with a PHQ-2 score of 0. Subjective      61 year old male who presents today to establish care, he was recently diagnosed with Diabetes mellitus type 2 after a hospitalization due to nausea and vomiting. Patient denied nausea, vomiting, cramps, headaches, fatigue, polyuria, polydipsia. He will start compliance with medication, and lifestyle modifications and we will follow up next week for diabetes screenings (retinopathy) / work up ( pneumococcal vaccine, statin). and treatment plan. Review of Systems   Constitutional: Negative for chills, fatigue and fever. HENT: Positive for dental problem. Negative for ear pain and sore throat. Eyes: Negative for pain and visual disturbance. Respiratory: Negative for cough, chest tightness, shortness of breath, wheezing and stridor. Cardiovascular: Negative for chest pain, palpitations and leg swelling.    Gastrointestinal: Negative for abdominal pain and vomiting. Genitourinary: Negative for dysuria and hematuria. Musculoskeletal: Negative for arthralgias and back pain. Skin: Negative for color change and rash. Neurological: Negative for seizures and syncope. Psychiatric/Behavioral: Negative for confusion. All other systems reviewed and are negative. Current Outpatient Medications on File Prior to Visit   Medication Sig   • nicotine (NICODERM CQ) 14 mg/24hr TD 24 hr patch Place 1 patch on the skin over 24 hours daily   • [DISCONTINUED] metFORMIN (GLUCOPHAGE) 1000 MG tablet Take 0.5 tablets (500 mg total) by mouth 2 (two) times a day with meals       Objective     /86 (BP Location: Left arm, Patient Position: Sitting, Cuff Size: Adult)   Pulse 87   Temp (!) 97.1 °F (36.2 °C) (Temporal)   Resp 18   Ht 5' 11" (1.803 m)   Wt 76.5 kg (168 lb 9.6 oz)   SpO2 99%   BMI 23.51 kg/m²     Physical Exam  Constitutional:       Appearance: Normal appearance. HENT:      Head: Normocephalic and atraumatic. Right Ear: External ear normal.      Left Ear: External ear normal.      Nose: Nose normal.      Mouth/Throat:      Mouth: Mucous membranes are moist.   Cardiovascular:      Rate and Rhythm: Normal rate and regular rhythm. Pulses: Normal pulses. Heart sounds: No murmur heard. No friction rub. No gallop. Pulmonary:      Effort: Pulmonary effort is normal. No respiratory distress. Breath sounds: Normal breath sounds. No wheezing. Abdominal:      General: Bowel sounds are normal. There is no distension. Tenderness: There is no abdominal tenderness. Musculoskeletal:         General: Normal range of motion. Cervical back: Normal range of motion and neck supple. Skin:     General: Skin is warm. Findings: No rash. Neurological:      General: No focal deficit present. Mental Status: He is alert and oriented to person, place, and time.      Sensory exam of the foot is normal, tested with the monofilament. Good pulses, no lesions or ulcers, good peripheral pulses.     Claribel Ring MD

## 2023-08-08 ENCOUNTER — OFFICE VISIT (OUTPATIENT)
Dept: FAMILY MEDICINE CLINIC | Facility: CLINIC | Age: 63
End: 2023-08-08

## 2023-08-08 VITALS
RESPIRATION RATE: 18 BRPM | HEIGHT: 71 IN | WEIGHT: 168.6 LBS | BODY MASS INDEX: 23.6 KG/M2 | DIASTOLIC BLOOD PRESSURE: 86 MMHG | OXYGEN SATURATION: 99 % | TEMPERATURE: 97.1 F | SYSTOLIC BLOOD PRESSURE: 138 MMHG | HEART RATE: 87 BPM

## 2023-08-08 DIAGNOSIS — F19.10 SUBSTANCE ABUSE (HCC): ICD-10-CM

## 2023-08-08 DIAGNOSIS — E11.9 TYPE 2 DIABETES MELLITUS WITHOUT COMPLICATION, WITHOUT LONG-TERM CURRENT USE OF INSULIN (HCC): ICD-10-CM

## 2023-08-08 DIAGNOSIS — Z11.3 SCREENING EXAMINATION FOR STD (SEXUALLY TRANSMITTED DISEASE): ICD-10-CM

## 2023-08-08 DIAGNOSIS — E87.1 HYPONATREMIA: ICD-10-CM

## 2023-08-08 PROCEDURE — 99204 OFFICE O/P NEW MOD 45 MIN: CPT | Performed by: INTERNAL MEDICINE

## 2023-08-08 NOTE — ASSESSMENT & PLAN NOTE
Lab Results   Component Value Date    SODIUM 131 (L) 08/04/2023    K 3.7 08/04/2023    CL 92 (L) 08/04/2023    CO2 30 08/04/2023    AGAP 9 08/04/2023    BUN 13 08/04/2023    CREATININE 0.84 08/04/2023    GLUC 204 (H) 08/04/2023    CALCIUM 8.0 (L) 08/04/2023    AST 30 08/02/2023    ALT 45 08/02/2023    ALKPHOS 85 08/02/2023    TP 7.9 08/02/2023    TBILI 1.79 (H) 08/02/2023    EGFR 93 08/04/2023     -Patient had an order for CMP yesterday but didn't go , last Na was 131, patient denied nausea, heeadache, confusion, fatigue, muscle spasms.  - He agreed to come tomorrow morning for blood work.     Plan:   BMP   Follow up next week

## 2023-08-08 NOTE — ASSESSMENT & PLAN NOTE
Px requested STD screening  Refers 2 sexual partners lifetime  Plan:  HIV  RPR  Hepatitis C  Hepatitis B panel  Ch/Gn urine

## 2023-08-08 NOTE — ASSESSMENT & PLAN NOTE
-Patient was recently diagnosed with Diabetes Mellitus type 2 during recent hospitalization due to hypochloremic hyponatremic metabolic acidosis - vomiting and nausea. - Patient cant re-call the last time he went to see a health provider before this admission  - Denied nausea , vomiting, polyuria, polydipsia, + polyphagia  - Diabetes education - lifestyle modifications    Lab Results   Component Value Date    HGBA1C 9.0 (H) 08/02/2023     Plan:  -Continue metformin 500 mg 2 times daily (patient was not being compliant, agreed to start taking it. - Close follow up , will see him next week for diabetes education  - Pending retinopathy screening, pneumococcal vaccine  -Need to start him on statin next week, for this week he will start taking biguanide.

## 2023-08-08 NOTE — ASSESSMENT & PLAN NOTE
-Referred he used K2 few months before his hospitalization.  -Has admissions for unintentional overdose - heroin  -Patient denies use of opioids or other drugs  Plan:  - Narcan , patient has 4 nasal sprays  - Counseling  - Follow up next week

## 2023-08-09 ENCOUNTER — APPOINTMENT (OUTPATIENT)
Dept: LAB | Facility: CLINIC | Age: 63
End: 2023-08-09
Payer: COMMERCIAL

## 2023-08-09 DIAGNOSIS — E87.1 HYPONATREMIA: ICD-10-CM

## 2023-08-09 DIAGNOSIS — Z11.3 SCREENING EXAMINATION FOR STD (SEXUALLY TRANSMITTED DISEASE): ICD-10-CM

## 2023-08-09 DIAGNOSIS — E11.9 TYPE 2 DIABETES MELLITUS WITHOUT COMPLICATION, WITHOUT LONG-TERM CURRENT USE OF INSULIN (HCC): ICD-10-CM

## 2023-08-09 LAB
ANION GAP SERPL CALCULATED.3IONS-SCNC: 5 MMOL/L
BUN SERPL-MCNC: 21 MG/DL (ref 5–25)
CALCIUM SERPL-MCNC: 8.8 MG/DL (ref 8.3–10.1)
CHLORIDE SERPL-SCNC: 107 MMOL/L (ref 96–108)
CHOLEST SERPL-MCNC: 148 MG/DL
CO2 SERPL-SCNC: 25 MMOL/L (ref 21–32)
CREAT SERPL-MCNC: 0.96 MG/DL (ref 0.6–1.3)
CREAT UR-MCNC: 109 MG/DL
GFR SERPL CREATININE-BSD FRML MDRD: 83 ML/MIN/1.73SQ M
GLUCOSE P FAST SERPL-MCNC: 212 MG/DL (ref 65–99)
HBV CORE AB SER QL: NORMAL
HBV SURFACE AB SER-ACNC: <3 MIU/ML
HCV AB SER QL: NORMAL
HDLC SERPL-MCNC: 45 MG/DL
HIV 1+2 AB+HIV1 P24 AG SERPL QL IA: NORMAL
HIV 2 AB SERPL QL IA: NORMAL
HIV1 AB SERPL QL IA: NORMAL
HIV1 P24 AG SERPL QL IA: NORMAL
LDLC SERPL CALC-MCNC: 87 MG/DL (ref 0–100)
MICROALBUMIN UR-MCNC: 39.8 MG/L (ref 0–20)
MICROALBUMIN/CREAT 24H UR: 37 MG/G CREATININE (ref 0–30)
NONHDLC SERPL-MCNC: 103 MG/DL
POTASSIUM SERPL-SCNC: 4.7 MMOL/L (ref 3.5–5.3)
SODIUM SERPL-SCNC: 137 MMOL/L (ref 135–147)
TREPONEMA PALLIDUM IGG+IGM AB [PRESENCE] IN SERUM OR PLASMA BY IMMUNOASSAY: NORMAL
TRIGL SERPL-MCNC: 79 MG/DL

## 2023-08-09 PROCEDURE — 82570 ASSAY OF URINE CREATININE: CPT

## 2023-08-09 PROCEDURE — 80048 BASIC METABOLIC PNL TOTAL CA: CPT

## 2023-08-09 PROCEDURE — 36415 COLL VENOUS BLD VENIPUNCTURE: CPT

## 2023-08-09 PROCEDURE — 86780 TREPONEMA PALLIDUM: CPT

## 2023-08-09 PROCEDURE — 87491 CHLMYD TRACH DNA AMP PROBE: CPT

## 2023-08-09 PROCEDURE — 87591 N.GONORRHOEAE DNA AMP PROB: CPT

## 2023-08-09 PROCEDURE — 87389 HIV-1 AG W/HIV-1&-2 AB AG IA: CPT

## 2023-08-09 PROCEDURE — 80061 LIPID PANEL: CPT

## 2023-08-09 PROCEDURE — 82043 UR ALBUMIN QUANTITATIVE: CPT

## 2023-08-09 PROCEDURE — 86704 HEP B CORE ANTIBODY TOTAL: CPT

## 2023-08-09 PROCEDURE — 86706 HEP B SURFACE ANTIBODY: CPT

## 2023-08-09 PROCEDURE — 86803 HEPATITIS C AB TEST: CPT

## 2023-08-10 ENCOUNTER — TELEPHONE (OUTPATIENT)
Dept: NEPHROLOGY | Facility: CLINIC | Age: 63
End: 2023-08-10

## 2023-08-10 LAB
C TRACH DNA SPEC QL NAA+PROBE: NEGATIVE
N GONORRHOEA DNA SPEC QL NAA+PROBE: NEGATIVE

## 2023-08-10 NOTE — TELEPHONE ENCOUNTER
----- Message from [place] sent at 8/10/2023  2:23 PM EDT -----  Please call patient and let him know that his sodium level is good on his repeat labs. He may continue to follow with his PCP and see nephrology as needed if his low sodium level re-occurs.      Thank you

## 2023-08-15 ENCOUNTER — OFFICE VISIT (OUTPATIENT)
Dept: FAMILY MEDICINE CLINIC | Facility: CLINIC | Age: 63
End: 2023-08-15

## 2023-08-15 VITALS
HEART RATE: 86 BPM | SYSTOLIC BLOOD PRESSURE: 120 MMHG | TEMPERATURE: 96.9 F | DIASTOLIC BLOOD PRESSURE: 70 MMHG | RESPIRATION RATE: 16 BRPM | OXYGEN SATURATION: 96 % | WEIGHT: 163 LBS | BODY MASS INDEX: 22.82 KG/M2 | HEIGHT: 71 IN

## 2023-08-15 DIAGNOSIS — E11.29 MICROALBUMINURIA DUE TO TYPE 2 DIABETES MELLITUS (HCC): ICD-10-CM

## 2023-08-15 DIAGNOSIS — R80.9 MICROALBUMINURIA DUE TO TYPE 2 DIABETES MELLITUS (HCC): ICD-10-CM

## 2023-08-15 DIAGNOSIS — F19.10 SUBSTANCE ABUSE (HCC): ICD-10-CM

## 2023-08-15 DIAGNOSIS — E11.65 TYPE 2 DIABETES MELLITUS WITH HYPERGLYCEMIA, WITHOUT LONG-TERM CURRENT USE OF INSULIN (HCC): Primary | ICD-10-CM

## 2023-08-15 LAB — SL AMB POCT GLUCOSE BLD: 147

## 2023-08-15 PROCEDURE — 90677 PCV20 VACCINE IM: CPT | Performed by: FAMILY MEDICINE

## 2023-08-15 PROCEDURE — 82948 REAGENT STRIP/BLOOD GLUCOSE: CPT | Performed by: FAMILY MEDICINE

## 2023-08-15 PROCEDURE — 90471 IMMUNIZATION ADMIN: CPT | Performed by: FAMILY MEDICINE

## 2023-08-15 PROCEDURE — 99214 OFFICE O/P EST MOD 30 MIN: CPT | Performed by: FAMILY MEDICINE

## 2023-08-15 RX ORDER — LISINOPRIL 5 MG/1
5 TABLET ORAL DAILY
Qty: 90 TABLET | Refills: 1 | Status: SHIPPED | OUTPATIENT
Start: 2023-08-15 | End: 2023-08-17 | Stop reason: SDUPTHER

## 2023-08-15 RX ORDER — ATORVASTATIN CALCIUM 20 MG/1
20 TABLET, FILM COATED ORAL DAILY
Qty: 90 TABLET | Refills: 1 | Status: SHIPPED | OUTPATIENT
Start: 2023-08-15 | End: 2023-08-17 | Stop reason: SDUPTHER

## 2023-08-15 NOTE — ASSESSMENT & PLAN NOTE
Albumin Creatinine ratio 37      Plan:   Start Ace inhibitor to decrease progression of diabetic nephropathy , lisinopril 5mg

## 2023-08-15 NOTE — PROGRESS NOTES
Name: Trixie Lindsey      : 1960      MRN: [id number]  Encounter Provider: Tanvir Yost MD  Encounter Date: 8/15/2023   Encounter department: [address] [facility],[address]     1. Type 2 diabetes mellitus with hyperglycemia, without long-term current use of insulin (HCC)  Assessment & Plan:  -Patient was recently diagnosed with Diabetes Mellitus type 2  - Denied nausea , vomiting, polyuria, polydipsia, + polyphagia  - Diabetes education - lifestyle modifications  - Last a1c 2023 9.0  - ASCVD risk - 27.3% , Atorvastatin 20 mg once a day  -POCT glucose 149        Plan:  -Continue metformin 500 mg 2 times daily   - Follow up in 6 weeks  - PPV23  -Retinopathy screening  - Start on statin Atorvastatin 20 mg once a day        Orders:  -     atorvastatin (LIPITOR) 20 mg tablet; Take 1 tablet (20 mg total) by mouth daily  -     lisinopril (ZESTRIL) 5 mg tablet; Take 1 tablet (5 mg total) by mouth daily  -     POCT blood glucose  -     IRIS Diabetic eye exam  -     Pneumococcal Conjugate Vaccine 20-valent (Pcv20)  -     Ambulatory referral to Podiatry; Future; Expected date: 08/15/2023    2. Microalbuminuria due to type 2 diabetes mellitus (HCC)  Assessment & Plan:  Albumin Creatinine ratio 37      Plan:   Start Ace inhibitor to decrease progression of diabetic nephropathy , lisinopril 5mg      Orders:  -     lisinopril (ZESTRIL) 5 mg tablet; Take 1 tablet (5 mg total) by mouth daily    3.  Substance abuse ([address])  Assessment & Plan:  Patient denied use of drugs since last visit    Plan:  -He has narcan at home   -Counseling           Subjective      61year old patient with recently diagnosed Diabetes Mellitus, he has been compliant with metformin 1000 mg after his last appointment , he misunderstood and was taking 1000 mg 3 times a day with meals, we went over management plan, he still has 1000 mg Metformin  prescribed at the ED, he will take half in the morning and half at night and will  the new prescription 500 mg  today. He experienced GI symptoms- diarrhea, denied polyuria, polydipsia, chest pain, n/v.     Review of Systems   Constitutional: Negative for chills and fever. HENT: Negative for ear pain and sore throat. Eyes: Negative for pain and visual disturbance. Respiratory: Negative for cough and shortness of breath. Cardiovascular: Negative for chest pain and palpitations. Gastrointestinal: Negative for abdominal pain and vomiting. Genitourinary: Negative for dysuria and hematuria. Musculoskeletal: Negative for arthralgias and back pain. Skin: Negative for color change and rash. Neurological: Negative for seizures and syncope. All other systems reviewed and are negative. Current Outpatient Medications on File Prior to Visit   Medication Sig   • metFORMIN (GLUCOPHAGE) 1000 MG tablet Take 0.5 tablets (500 mg total) by mouth 2 (two) times a day with meals   • nicotine (NICODERM CQ) 14 mg/24hr TD 24 hr patch Place 1 patch on the skin over 24 hours daily       Objective     /70 (BP Location: Left arm, Patient Position: Sitting, Cuff Size: Standard)   Pulse 86   Temp (!) 96.9 °F (36.1 °C) (Temporal)   Resp 16   Ht 5' 11" (1.803 m)   Wt 73.9 kg (163 lb)   SpO2 96%   BMI 22.73 kg/m²     Physical Exam  Constitutional:       Appearance: Normal appearance. HENT:      Head: Normocephalic and atraumatic. Right Ear: External ear normal.      Left Ear: External ear normal.      Nose: Nose normal.   Cardiovascular:      Rate and Rhythm: Normal rate and regular rhythm. Pulses: Normal pulses. Dorsalis pedis pulses are 2+ on the right side and 2+ on the left side. Heart sounds: No murmur heard. No friction rub. No gallop. Pulmonary:      Effort: Pulmonary effort is normal. No respiratory distress. Breath sounds: Normal breath sounds. No wheezing.    Abdominal:      General: Bowel sounds are normal. There is no distension. Tenderness: There is no abdominal tenderness. Musculoskeletal:         General: Normal range of motion. Cervical back: Normal range of motion and neck supple. Feet:      Right foot:      Skin integrity: No ulcer, skin breakdown, erythema, warmth, callus or dry skin. Left foot:      Skin integrity: No ulcer, skin breakdown, erythema, warmth, callus or dry skin. Skin:     Findings: No rash. Neurological:      General: No focal deficit present. Mental Status: He is alert. Patient's shoes and socks removed. Right Foot/Ankle   Right Foot Inspection  Skin Exam: skin normal and skin intact. No dry skin, no warmth, no callus, no erythema, no maceration, no abnormal color, no pre-ulcer, no ulcer and no callus. Toe Exam: No swelling, no tenderness, erythema and  no right toe deformity    Sensory   Vibration: intact  Proprioception: intact  Monofilament testing: intact    Vascular  Capillary refills: < 3 seconds  The right DP pulse is 2+. Left Foot/Ankle  Left Foot Inspection  Skin Exam: skin normal and skin intact. No dry skin, no warmth, no erythema, no maceration, normal color, no pre-ulcer, no ulcer and no callus. Toe Exam: No swelling, no tenderness, no erythema and no left toe deformity. Sensory   Vibration: intact  Proprioception: intact  Monofilament testing: intact    Vascular  Capillary refills: < 3 seconds  The left DP pulse is 2+.      Verner Mould, MD

## 2023-08-15 NOTE — ASSESSMENT & PLAN NOTE
-Patient was recently diagnosed with Diabetes Mellitus type 2  - Denied nausea , vomiting, polyuria, polydipsia, + polyphagia  - Diabetes education - lifestyle modifications  - Last a1c 08/02/2023 9.0  - ASCVD risk - 27.3% , Atorvastatin 20 mg once a day  -POCT glucose 149        Plan:  -Continue metformin 500 mg 2 times daily   - Follow up in 6 weeks  - PPV23  -Retinopathy screening  - Start on statin Atorvastatin 20 mg once a day

## 2023-08-16 ENCOUNTER — TELEPHONE (OUTPATIENT)
Dept: FAMILY MEDICINE CLINIC | Facility: CLINIC | Age: 63
End: 2023-08-16

## 2023-08-16 NOTE — TELEPHONE ENCOUNTER
08/16/23    Pt came into the office today requesting if the following med script:  atorvastatin (LIPITOR) 20 mg tablet    lisinopril (ZESTRIL) 5 mg tablet    can be resent but with a PCP that Participate with Medicaid Insurance. Pt was told by the Pharmacy that his PCP does not Participate with Medicaid and can’t Dispense medication (PT STATED)       Any questions, please contact Pt.

## 2023-08-17 RX ORDER — ATORVASTATIN CALCIUM 20 MG/1
20 TABLET, FILM COATED ORAL DAILY
Qty: 90 TABLET | Refills: 1 | Status: SHIPPED | OUTPATIENT
Start: 2023-08-17

## 2023-08-17 RX ORDER — LISINOPRIL 5 MG/1
5 TABLET ORAL DAILY
Qty: 90 TABLET | Refills: 1 | Status: SHIPPED | OUTPATIENT
Start: 2023-08-17

## 2023-08-25 ENCOUNTER — TELEPHONE (OUTPATIENT)
Dept: FAMILY MEDICINE CLINIC | Facility: CLINIC | Age: 63
End: 2023-08-25

## 2023-08-25 NOTE — TELEPHONE ENCOUNTER
IEB FORM RECEIVED ON 8/25/2023  GIVEN TO AMIRA PATEL TO BE COMPLETED, SIGNED BY MD/ (INCLUDE LISC.  NUMBER), AND FAXED BACK IN 3 DAYS

## 2023-08-29 ENCOUNTER — TELEPHONE (OUTPATIENT)
Dept: FAMILY MEDICINE CLINIC | Facility: CLINIC | Age: 63
End: 2023-08-29

## 2023-08-29 DIAGNOSIS — R26.2 AMBULATORY DYSFUNCTION: Primary | ICD-10-CM

## 2023-08-29 DIAGNOSIS — M19.90 ARTHRITIS: ICD-10-CM

## 2023-08-29 NOTE — TELEPHONE ENCOUNTER
Called patient in regards to Physician Certification Form for [employer]. Spoke with pt and his wife, states he has difficulty with balance and severe arthritis in the feet, knee and back making is difficulty to walk. Form completed and given to staff.

## 2023-09-26 ENCOUNTER — OFFICE VISIT (OUTPATIENT)
Dept: FAMILY MEDICINE CLINIC | Facility: CLINIC | Age: 63
End: 2023-09-26

## 2023-09-26 VITALS
DIASTOLIC BLOOD PRESSURE: 80 MMHG | WEIGHT: 172 LBS | TEMPERATURE: 97.8 F | OXYGEN SATURATION: 99 % | HEIGHT: 71 IN | SYSTOLIC BLOOD PRESSURE: 128 MMHG | BODY MASS INDEX: 24.08 KG/M2 | RESPIRATION RATE: 16 BRPM | HEART RATE: 91 BPM

## 2023-09-26 DIAGNOSIS — E11.9 TYPE 2 DIABETES MELLITUS WITHOUT COMPLICATION, WITHOUT LONG-TERM CURRENT USE OF INSULIN (HCC): ICD-10-CM

## 2023-09-26 DIAGNOSIS — E11.65 TYPE 2 DIABETES MELLITUS WITH HYPERGLYCEMIA, WITHOUT LONG-TERM CURRENT USE OF INSULIN (HCC): ICD-10-CM

## 2023-09-26 DIAGNOSIS — N52.1 ERECTILE DYSFUNCTION DUE TO DISEASES CLASSIFIED ELSEWHERE: ICD-10-CM

## 2023-09-26 DIAGNOSIS — N52.9 IMPOTENCE: Primary | ICD-10-CM

## 2023-09-26 DIAGNOSIS — R80.9 MICROALBUMINURIA DUE TO TYPE 2 DIABETES MELLITUS: ICD-10-CM

## 2023-09-26 DIAGNOSIS — E11.29 MICROALBUMINURIA DUE TO TYPE 2 DIABETES MELLITUS: ICD-10-CM

## 2023-09-26 PROCEDURE — 99213 OFFICE O/P EST LOW 20 MIN: CPT | Performed by: INTERNAL MEDICINE

## 2023-09-26 RX ORDER — ATORVASTATIN CALCIUM 20 MG/1
20 TABLET, FILM COATED ORAL DAILY
Qty: 90 TABLET | Refills: 1 | Status: SHIPPED | OUTPATIENT
Start: 2023-09-26

## 2023-09-26 RX ORDER — LISINOPRIL 5 MG/1
5 TABLET ORAL DAILY
Qty: 90 TABLET | Refills: 1 | Status: SHIPPED | OUTPATIENT
Start: 2023-09-26

## 2023-09-26 NOTE — ASSESSMENT & PLAN NOTE
Patient refers low energy, decrease in self esteem after diagnosis of diabetes due to impotence. Unclear if patient has sexual partner, and if cause is psychologic or functional.   Patient difficult to interrogate, defensive and re-directs conversation. Only thing he was able to share is that before the diagnosis of diabetes, his sexual function was at 60% and now 15%. He referred his friend gave him a cream, that he applies to inner tight and improves his symptoms. He will bring it , or name in next appointment.     PLAN:  Free Testosterone level  Smoking cessation  TSH  Follow up in 2 weeks

## 2023-09-26 NOTE — ASSESSMENT & PLAN NOTE
Uncontrolled  Denied polyuria, polyphagia, polydipsia  Lab Results   Component Value Date    HGBA1C 9.0 (H) 08/02/2023   Refers he has been compliant with medication with no side effects.   hba1c due 11/2/23  PLAN:  Metformin 500 mg BID  Atorvastatin 20 mg daily  Lisinopril 5mg daily

## 2023-09-26 NOTE — PROGRESS NOTES
Name: Alex Long      : 1960      MRN: [id number]  Encounter Provider: Adrianna Swan MD  Encounter Date: 2023   Encounter department: [address] [facility] [address]     1. Impotence  Assessment & Plan:  Patient refers low energy, decrease in self esteem after diagnosis of diabetes due to impotence. Unclear if patient has sexual partner, and if cause is psychologic or functional.   Patient difficult to interrogate, defensive and re-directs conversation. Only thing he was able to share is that before the diagnosis of diabetes, his sexual function was at 60% and now 15%. He referred his friend gave him a cream, that he applies to inner tight and improves his symptoms. He will bring it , or name in next appointment. PLAN:  Free Testosterone level  Smoking cessation  TSH  Follow up in 2 weeks    Orders:  -     Testosterone, free, total; Future  -     TSH, 3rd generation with Free T4 reflex; Future    2. Type 2 diabetes mellitus without complication, without long-term current use of insulin (HCC)  Assessment & Plan:  Uncontrolled  Denied polyuria, polyphagia, polydipsia  Lab Results   Component Value Date    HGBA1C 9.0 (H) 2023   Refers he has been compliant with medication with no side effects. hba1c due 23  PLAN:  Metformin 500 mg BID  Atorvastatin 20 mg daily  Lisinopril 5mg daily    Orders:  -     metFORMIN (GLUCOPHAGE) 1000 MG tablet; Take 0.5 tablets (500 mg total) by mouth 2 (two) times a day with meals      3. Microalbuminuria due to type 2 diabetes mellitus (HCC)  -     lisinopril (ZESTRIL) 5 mg tablet; Take 1 tablet (5 mg total) by mouth daily    4. Erectile dysfunction due to diseases classified elsewhere  -     Testosterone, free, total; Future  -     TSH, 3rd generation with Free T4 reflex;  Future         Subjective      61year old male with recently diagnosed Diabetes Mellitus type 2, he also has a PMH remarkable for smoking,he presents to the office today with a 2 month history of impotence, patient also endorses decreased energy, and low self esteem. Differential diagnosis includes: 1. Depresison PHQ9- negative, 2. Hypothyroidism - TSH ordered 3. Age related/ Smoking/ Vascular cause: ordered testosterone. Unclear if cause of impotence is physiologic or psychologic, patient is difficult to interrogate, we build rapport in todays visit , and will follow up in 2 weeks after blood results to get possible cause of impotence. Patient is aware smoking worsens his symptoms, he has been trying to avoid smoking. Review of Systems   Constitutional: Negative for chills and fever. HENT: Negative for ear pain and sore throat. Eyes: Negative for pain and visual disturbance. Respiratory: Negative for cough and shortness of breath. Cardiovascular: Negative for chest pain and palpitations. Gastrointestinal: Negative for abdominal pain and vomiting. Genitourinary: Negative for dysuria, hematuria and penile discharge. Impotence   Musculoskeletal: Negative for arthralgias and back pain. Skin: Negative for color change and rash. Neurological: Negative for seizures and syncope. All other systems reviewed and are negative.       Current Outpatient Medications on File Prior to Visit   Medication Sig   • nicotine (NICODERM CQ) 14 mg/24hr TD 24 hr patch Place 1 patch on the skin over 24 hours daily   • [DISCONTINUED] atorvastatin (LIPITOR) 20 mg tablet Take 1 tablet (20 mg total) by mouth daily   • [DISCONTINUED] lisinopril (ZESTRIL) 5 mg tablet Take 1 tablet (5 mg total) by mouth daily   • [DISCONTINUED] metFORMIN (GLUCOPHAGE) 1000 MG tablet Take 0.5 tablets (500 mg total) by mouth 2 (two) times a day with meals       Objective     /80 (BP Location: Left arm, Patient Position: Sitting, Cuff Size: Standard)   Pulse 91   Temp 97.8 °F (36.6 °C) (Temporal)   Resp 16   Ht 5' 11" (1.803 m)   Wt 78 kg (172 lb)   SpO2 99%   BMI 23.99 kg/m²     Physical Exam  Constitutional:       Appearance: Normal appearance. HENT:      Head: Normocephalic and atraumatic. Right Ear: External ear normal.      Left Ear: External ear normal.      Nose: Nose normal.      Mouth/Throat:      Mouth: Mucous membranes are moist.   Cardiovascular:      Rate and Rhythm: Normal rate and regular rhythm. Pulses: Normal pulses. Heart sounds: No murmur heard. No friction rub. No gallop. Pulmonary:      Effort: Pulmonary effort is normal. No respiratory distress. Breath sounds: Normal breath sounds. No wheezing. Abdominal:      General: Bowel sounds are normal. There is no distension. Tenderness: There is no abdominal tenderness. Musculoskeletal:         General: Normal range of motion. Cervical back: Normal range of motion and neck supple. Skin:     Findings: No rash. Neurological:      General: No focal deficit present. Mental Status: He is alert.        Vernell Casarez MD

## 2023-09-27 ENCOUNTER — APPOINTMENT (OUTPATIENT)
Dept: LAB | Facility: CLINIC | Age: 63
End: 2023-09-27
Payer: COMMERCIAL

## 2023-09-27 DIAGNOSIS — N52.9 IMPOTENCE: ICD-10-CM

## 2023-09-27 DIAGNOSIS — N52.1 ERECTILE DYSFUNCTION DUE TO DISEASES CLASSIFIED ELSEWHERE: ICD-10-CM

## 2023-09-27 LAB — TSH SERPL DL<=0.05 MIU/L-ACNC: 2.6 UIU/ML (ref 0.45–4.5)

## 2023-09-27 PROCEDURE — 36415 COLL VENOUS BLD VENIPUNCTURE: CPT

## 2023-09-27 PROCEDURE — 84402 ASSAY OF FREE TESTOSTERONE: CPT

## 2023-09-27 PROCEDURE — 84443 ASSAY THYROID STIM HORMONE: CPT

## 2023-09-27 PROCEDURE — 84403 ASSAY OF TOTAL TESTOSTERONE: CPT

## 2023-09-29 LAB
TESTOST FREE SERPL-MCNC: 9.7 PG/ML (ref 6.6–18.1)
TESTOST SERPL-MCNC: 406 NG/DL (ref 264–916)

## 2023-10-07 PROBLEM — Z11.3 SCREENING EXAMINATION FOR STD (SEXUALLY TRANSMITTED DISEASE): Status: RESOLVED | Noted: 2023-08-08 | Resolved: 2023-10-07

## 2023-11-15 ENCOUNTER — PATIENT OUTREACH (OUTPATIENT)
Dept: OTHER | Facility: OTHER | Age: 63
End: 2023-11-15

## 2023-11-16 NOTE — PROGRESS NOTES
11/15/23: Client requested his BP be checked: 140/90. He denied taking meds for BP.  Will route note to PCP

## 2023-11-16 NOTE — PROGRESS NOTES
Good morning he should be taking lisinopril  low dose because he is diabetic. Please let me know if he has another BP >140/90 will increase the dosis. I ll call him. Thank you!!!   Susanne Rothman

## 2024-01-03 ENCOUNTER — PATIENT OUTREACH (OUTPATIENT)
Dept: OTHER | Facility: OTHER | Age: 64
End: 2024-01-03

## 2024-01-09 ENCOUNTER — TELEPHONE (OUTPATIENT)
Dept: FAMILY MEDICINE CLINIC | Facility: CLINIC | Age: 64
End: 2024-01-09

## 2024-01-09 NOTE — TELEPHONE ENCOUNTER
01/09/24    first attempt to contact patient. no answer    Couldn't leave message    Contact # “not in service”       If Pt contacts office, please assist Pt with armand a f/u appt   Appt Note: f/u DM

## 2024-01-25 ENCOUNTER — LAB REQUISITION (OUTPATIENT)
Dept: LAB | Facility: HOSPITAL | Age: 64
End: 2024-01-25

## 2024-01-25 DIAGNOSIS — E78.5 HYPERLIPIDEMIA, UNSPECIFIED: ICD-10-CM

## 2024-01-25 DIAGNOSIS — Z13.1 ENCOUNTER FOR SCREENING FOR DIABETES MELLITUS: ICD-10-CM

## 2024-01-25 LAB
CHOLEST SERPL-MCNC: 174 MG/DL
GLUCOSE P FAST SERPL-MCNC: 261 MG/DL (ref 65–99)
HDLC SERPL-MCNC: 49 MG/DL
LDLC SERPL CALC-MCNC: 109 MG/DL (ref 0–100)
NONHDLC SERPL-MCNC: 125 MG/DL
TRIGL SERPL-MCNC: 82 MG/DL

## 2024-01-25 PROCEDURE — 82947 ASSAY GLUCOSE BLOOD QUANT: CPT | Performed by: PEDIATRICS

## 2024-01-25 PROCEDURE — 80061 LIPID PANEL: CPT | Performed by: PEDIATRICS

## 2024-03-15 ENCOUNTER — TELEPHONE (OUTPATIENT)
Dept: FAMILY MEDICINE CLINIC | Facility: CLINIC | Age: 64
End: 2024-03-15

## 2024-03-15 NOTE — TELEPHONE ENCOUNTER
Pt had appt on 3/12/24 and did not show    Tried calling pt and could not leave message on vm     No show letter is sent

## 2024-05-17 ENCOUNTER — TELEPHONE (OUTPATIENT)
Dept: FAMILY MEDICINE CLINIC | Facility: CLINIC | Age: 64
End: 2024-05-17

## 2024-05-17 NOTE — TELEPHONE ENCOUNTER
NO LONGER A [facility] PATIENT. PLEASE REMOVE FROM PATIENT PANEL.   NEW PCP: MASOOD KENNEDY,

## 2024-05-22 NOTE — TELEPHONE ENCOUNTER
05/22/24 2:07 PM        The office's request has been received, reviewed, and the patient chart updated. The PCP has successfully been removed with a patient attribution note. This message will now be completed.        Thank you  Darren Corbin

## 2024-07-17 ENCOUNTER — HOSPITAL ENCOUNTER (INPATIENT)
Facility: HOSPITAL | Age: 64
LOS: 2 days | Discharge: HOME | DRG: 469 | End: 2024-07-19
Attending: EMERGENCY MEDICINE | Admitting: INTERNAL MEDICINE
Payer: COMMERCIAL

## 2024-07-17 DIAGNOSIS — R80.9 MICROALBUMINURIA DUE TO TYPE 2 DIABETES MELLITUS  (HCC): ICD-10-CM

## 2024-07-17 DIAGNOSIS — E11.29 MICROALBUMINURIA DUE TO TYPE 2 DIABETES MELLITUS  (HCC): ICD-10-CM

## 2024-07-17 DIAGNOSIS — E11.65 TYPE 2 DIABETES MELLITUS WITH HYPERGLYCEMIA, WITHOUT LONG-TERM CURRENT USE OF INSULIN (HCC): ICD-10-CM

## 2024-07-17 DIAGNOSIS — F17.200 NICOTINE DEPENDENCE: ICD-10-CM

## 2024-07-17 DIAGNOSIS — R11.2 N&V (NAUSEA AND VOMITING): ICD-10-CM

## 2024-07-17 DIAGNOSIS — N17.9 AKI (ACUTE KIDNEY INJURY) (HCC): Primary | ICD-10-CM

## 2024-07-17 DIAGNOSIS — E11.9 TYPE 2 DIABETES MELLITUS WITHOUT COMPLICATION, WITHOUT LONG-TERM CURRENT USE OF INSULIN (HCC): ICD-10-CM

## 2024-07-17 PROBLEM — I10 HYPERTENSION: Status: ACTIVE | Noted: 2024-07-17

## 2024-07-17 PROBLEM — E87.29 HIGH ANION GAP METABOLIC ACIDOSIS: Status: ACTIVE | Noted: 2024-07-17

## 2024-07-17 LAB
2HR DELTA HS TROPONIN: 0 NG/L
ALBUMIN SERPL BCG-MCNC: 5.1 G/DL (ref 3.5–5)
ALP SERPL-CCNC: 74 U/L (ref 34–104)
ALT SERPL W P-5'-P-CCNC: 35 U/L (ref 7–52)
ANION GAP SERPL CALCULATED.3IONS-SCNC: 17 MMOL/L (ref 4–13)
AST SERPL W P-5'-P-CCNC: 92 U/L (ref 13–39)
ATRIAL RATE: 94 BPM
B-OH-BUTYR SERPL-MCNC: 0.21 MMOL/L (ref 0.02–0.27)
BASOPHILS # BLD MANUAL: 0 THOUSAND/UL (ref 0–0.1)
BASOPHILS NFR MAR MANUAL: 0 % (ref 0–1)
BILIRUB SERPL-MCNC: 1.12 MG/DL (ref 0.2–1)
BUN SERPL-MCNC: 65 MG/DL (ref 5–25)
CALCIUM SERPL-MCNC: 9.8 MG/DL (ref 8.4–10.2)
CARDIAC TROPONIN I PNL SERPL HS: 15 NG/L
CARDIAC TROPONIN I PNL SERPL HS: 15 NG/L
CHLORIDE SERPL-SCNC: 82 MMOL/L (ref 96–108)
CO2 SERPL-SCNC: 28 MMOL/L (ref 21–32)
CREAT SERPL-MCNC: 2.51 MG/DL (ref 0.6–1.3)
EOSINOPHIL # BLD MANUAL: 0 THOUSAND/UL (ref 0–0.4)
EOSINOPHIL NFR BLD MANUAL: 0 % (ref 0–6)
ERYTHROCYTE [DISTWIDTH] IN BLOOD BY AUTOMATED COUNT: 13.5 % (ref 11.6–15.1)
EST. AVERAGE GLUCOSE BLD GHB EST-MCNC: 183 MG/DL
GFR SERPL CREATININE-BSD FRML MDRD: 26 ML/MIN/1.73SQ M
GLUCOSE SERPL-MCNC: 237 MG/DL (ref 65–140)
GLUCOSE SERPL-MCNC: 303 MG/DL (ref 65–140)
GLUCOSE SERPL-MCNC: 339 MG/DL (ref 65–140)
HBA1C MFR BLD: 8 %
HCT VFR BLD AUTO: 48 % (ref 36.5–49.3)
HGB BLD-MCNC: 17 G/DL (ref 12–17)
LACTATE SERPL-SCNC: 1.3 MMOL/L (ref 0.5–2)
LIPASE SERPL-CCNC: 10 U/L (ref 11–82)
LYMPHOCYTES # BLD AUTO: 0.83 THOUSAND/UL (ref 0.6–4.47)
LYMPHOCYTES # BLD AUTO: 3 % (ref 14–44)
MCH RBC QN AUTO: 32.8 PG (ref 26.8–34.3)
MCHC RBC AUTO-ENTMCNC: 35.4 G/DL (ref 31.4–37.4)
MCV RBC AUTO: 93 FL (ref 82–98)
MONOCYTES # BLD AUTO: 1 THOUSAND/UL (ref 0–1.22)
MONOCYTES NFR BLD: 6 % (ref 4–12)
NEUTROPHILS # BLD MANUAL: 14.77 THOUSAND/UL (ref 1.85–7.62)
NEUTS BAND NFR BLD MANUAL: 3 % (ref 0–8)
NEUTS SEG NFR BLD AUTO: 86 % (ref 43–75)
P AXIS: 74 DEGREES
PLATELET # BLD AUTO: 191 THOUSANDS/UL (ref 149–390)
PLATELET BLD QL SMEAR: ADEQUATE
PMV BLD AUTO: 12 FL (ref 8.9–12.7)
POTASSIUM SERPL-SCNC: 5.3 MMOL/L (ref 3.5–5.3)
PR INTERVAL: 198 MS
PROT SERPL-MCNC: 8.3 G/DL (ref 6.4–8.4)
QRS AXIS: 190 DEGREES
QRSD INTERVAL: 134 MS
QT INTERVAL: 382 MS
QTC INTERVAL: 477 MS
RBC # BLD AUTO: 5.18 MILLION/UL (ref 3.88–5.62)
RBC MORPH BLD: NORMAL
SODIUM SERPL-SCNC: 127 MMOL/L (ref 135–147)
T WAVE AXIS: 22 DEGREES
VARIANT LYMPHS # BLD AUTO: 2 %
VENTRICULAR RATE: 94 BPM
WBC # BLD AUTO: 16.59 THOUSAND/UL (ref 4.31–10.16)

## 2024-07-17 PROCEDURE — 85007 BL SMEAR W/DIFF WBC COUNT: CPT | Performed by: EMERGENCY MEDICINE

## 2024-07-17 PROCEDURE — 80053 COMPREHEN METABOLIC PANEL: CPT | Performed by: EMERGENCY MEDICINE

## 2024-07-17 PROCEDURE — 93010 ELECTROCARDIOGRAM REPORT: CPT | Performed by: STUDENT IN AN ORGANIZED HEALTH CARE EDUCATION/TRAINING PROGRAM

## 2024-07-17 PROCEDURE — 83605 ASSAY OF LACTIC ACID: CPT | Performed by: INTERNAL MEDICINE

## 2024-07-17 PROCEDURE — 99284 EMERGENCY DEPT VISIT MOD MDM: CPT

## 2024-07-17 PROCEDURE — 83690 ASSAY OF LIPASE: CPT | Performed by: EMERGENCY MEDICINE

## 2024-07-17 PROCEDURE — 36415 COLL VENOUS BLD VENIPUNCTURE: CPT | Performed by: EMERGENCY MEDICINE

## 2024-07-17 PROCEDURE — 85027 COMPLETE CBC AUTOMATED: CPT | Performed by: EMERGENCY MEDICINE

## 2024-07-17 PROCEDURE — 93005 ELECTROCARDIOGRAM TRACING: CPT

## 2024-07-17 PROCEDURE — 82948 REAGENT STRIP/BLOOD GLUCOSE: CPT

## 2024-07-17 PROCEDURE — 84484 ASSAY OF TROPONIN QUANT: CPT | Performed by: EMERGENCY MEDICINE

## 2024-07-17 PROCEDURE — 99285 EMERGENCY DEPT VISIT HI MDM: CPT | Performed by: EMERGENCY MEDICINE

## 2024-07-17 PROCEDURE — 99223 1ST HOSP IP/OBS HIGH 75: CPT | Performed by: INTERNAL MEDICINE

## 2024-07-17 PROCEDURE — 96374 THER/PROPH/DIAG INJ IV PUSH: CPT

## 2024-07-17 PROCEDURE — 82010 KETONE BODYS QUAN: CPT | Performed by: INTERNAL MEDICINE

## 2024-07-17 PROCEDURE — 84484 ASSAY OF TROPONIN QUANT: CPT | Performed by: INTERNAL MEDICINE

## 2024-07-17 PROCEDURE — 83036 HEMOGLOBIN GLYCOSYLATED A1C: CPT | Performed by: INTERNAL MEDICINE

## 2024-07-17 RX ORDER — SODIUM CHLORIDE 9 MG/ML
150 INJECTION, SOLUTION INTRAVENOUS CONTINUOUS
Status: DISCONTINUED | OUTPATIENT
Start: 2024-07-17 | End: 2024-07-19

## 2024-07-17 RX ORDER — ONDANSETRON 2 MG/ML
4 INJECTION INTRAMUSCULAR; INTRAVENOUS ONCE
Status: COMPLETED | OUTPATIENT
Start: 2024-07-17 | End: 2024-07-17

## 2024-07-17 RX ORDER — ACETAMINOPHEN 325 MG/1
650 TABLET ORAL EVERY 6 HOURS PRN
Status: DISCONTINUED | OUTPATIENT
Start: 2024-07-17 | End: 2024-07-19 | Stop reason: HOSPADM

## 2024-07-17 RX ORDER — INSULIN GLARGINE 100 [IU]/ML
10 INJECTION, SOLUTION SUBCUTANEOUS
Status: DISCONTINUED | OUTPATIENT
Start: 2024-07-17 | End: 2024-07-18

## 2024-07-17 RX ORDER — SUCRALFATE 1 G/1
1 TABLET ORAL
Status: DISCONTINUED | OUTPATIENT
Start: 2024-07-17 | End: 2024-07-19 | Stop reason: HOSPADM

## 2024-07-17 RX ORDER — PANTOPRAZOLE SODIUM 40 MG/10ML
40 INJECTION, POWDER, LYOPHILIZED, FOR SOLUTION INTRAVENOUS EVERY 12 HOURS SCHEDULED
Status: DISCONTINUED | OUTPATIENT
Start: 2024-07-17 | End: 2024-07-19 | Stop reason: HOSPADM

## 2024-07-17 RX ORDER — HEPARIN SODIUM 5000 [USP'U]/ML
5000 INJECTION, SOLUTION INTRAVENOUS; SUBCUTANEOUS EVERY 8 HOURS SCHEDULED
Status: DISCONTINUED | OUTPATIENT
Start: 2024-07-17 | End: 2024-07-19 | Stop reason: HOSPADM

## 2024-07-17 RX ORDER — MAGNESIUM HYDROXIDE/ALUMINUM HYDROXICE/SIMETHICONE 120; 1200; 1200 MG/30ML; MG/30ML; MG/30ML
30 SUSPENSION ORAL EVERY 6 HOURS PRN
Status: DISCONTINUED | OUTPATIENT
Start: 2024-07-17 | End: 2024-07-19 | Stop reason: HOSPADM

## 2024-07-17 RX ORDER — ONDANSETRON 2 MG/ML
4 INJECTION INTRAMUSCULAR; INTRAVENOUS EVERY 6 HOURS PRN
Status: DISCONTINUED | OUTPATIENT
Start: 2024-07-17 | End: 2024-07-19 | Stop reason: HOSPADM

## 2024-07-17 RX ORDER — LABETALOL HYDROCHLORIDE 5 MG/ML
10 INJECTION, SOLUTION INTRAVENOUS EVERY 4 HOURS PRN
Status: DISCONTINUED | OUTPATIENT
Start: 2024-07-17 | End: 2024-07-19 | Stop reason: HOSPADM

## 2024-07-17 RX ORDER — NICOTINE 21 MG/24HR
1 PATCH, TRANSDERMAL 24 HOURS TRANSDERMAL DAILY
Status: DISCONTINUED | OUTPATIENT
Start: 2024-07-18 | End: 2024-07-19 | Stop reason: HOSPADM

## 2024-07-17 RX ORDER — INSULIN LISPRO 100 [IU]/ML
1-6 INJECTION, SOLUTION INTRAVENOUS; SUBCUTANEOUS
Status: DISCONTINUED | OUTPATIENT
Start: 2024-07-17 | End: 2024-07-19 | Stop reason: HOSPADM

## 2024-07-17 RX ADMIN — INSULIN LISPRO 3 UNITS: 100 INJECTION, SOLUTION INTRAVENOUS; SUBCUTANEOUS at 22:09

## 2024-07-17 RX ADMIN — PANTOPRAZOLE SODIUM 40 MG: 40 INJECTION, POWDER, FOR SOLUTION INTRAVENOUS at 20:58

## 2024-07-17 RX ADMIN — SODIUM CHLORIDE 150 ML/HR: 0.9 INJECTION, SOLUTION INTRAVENOUS at 20:58

## 2024-07-17 RX ADMIN — INSULIN GLARGINE 10 UNITS: 100 INJECTION, SOLUTION SUBCUTANEOUS at 22:09

## 2024-07-17 RX ADMIN — HEPARIN SODIUM 5000 UNITS: 5000 INJECTION INTRAVENOUS; SUBCUTANEOUS at 22:09

## 2024-07-17 RX ADMIN — SODIUM CHLORIDE 150 ML/HR: 0.9 INJECTION, SOLUTION INTRAVENOUS at 17:50

## 2024-07-17 RX ADMIN — SUCRALFATE 1 G: 1 TABLET ORAL at 22:10

## 2024-07-17 RX ADMIN — SODIUM CHLORIDE 1000 ML: 0.9 INJECTION, SOLUTION INTRAVENOUS at 17:05

## 2024-07-17 RX ADMIN — ONDANSETRON 4 MG: 2 INJECTION INTRAMUSCULAR; INTRAVENOUS at 15:57

## 2024-07-17 NOTE — ED PROCEDURE NOTE
PROCEDURE  ECG 12 Lead Documentation Only    Date/Time: 7/17/2024 4:01 PM    Performed by: Nakul Coyle MD  Authorized by: Nakul Coyle MD    Indications / Diagnosis:  Nv x 2 days  ECG reviewed by me, the ED Provider: yes    Patient location:  ED  Interpretation:     Interpretation: abnormal    Rate:     ECG rate:  94    ECG rate assessment: normal    Rhythm:     Rhythm: sinus rhythm    Ectopy:     Ectopy: none    QRS:     QRS axis:  Normal    QRS intervals:  Normal  Conduction:     Conduction: abnormal      Abnormal conduction: complete RBBB    ST segments:     ST segments:  Normal  T waves:     T waves: normal         Nakul Coyle MD  07/17/24 1615

## 2024-07-17 NOTE — H&P
[facility]  H&P  Name: Obed Kelley 64 y.o. male I MRN: [id number]  Unit/Bed#: 7T [facility] 705-01 I Date of Admission: 7/17/2024   Date of Service: 7/17/2024 I Hospital Day: 0      Assessment & Plan   * LUZ (acute kidney injury) (HCC)  Assessment & Plan  Prerenal LUZ in setting of poor oral intake suspected secondary to a viral gastroenteritis  Given 1 L normal saline emergency department  Will continue normal saline overnight  Trend BMP    Intractable nausea and vomiting  Assessment & Plan  Patient reports he had melon over the weekend and then has not been able to eat and has had minimal fluids since then  Suspect viral gastroenteritis  Patient does have a mild increase in bilirubin and will trend LFTs, if symptoms do not improve and LFTs trending up we will consider further imaging  Supportive care with IV fluids, IV protonix, carafate QID and zofran prn     High anion gap metabolic acidosis  Assessment & Plan  Suspect this is related to LUZ   Check lactic acid  For completeness and to not miss early DKA will check VBG and beta hydroxybutyrate    Diabetes mellitus (HCC)  Assessment & Plan  Lab Results   Component Value Date    HGBA1C 9.0 (H) 08/02/2023       Recent Labs     07/17/24  1547   POCGLU 339*       Blood Sugar Average: Last 72 hrs:  (P) 339    Hemoglobin A1c 9.0 approximately 1 year ago.  Patient reports he is not taking any medications at all.  Start basal bolus protocol while admitted. start 10 units of Lantus nightly plus sliding scale insulin  Will update hemoglobin A1c which will help guide treatment on discharge      Substance abuse (HCC)  Assessment & Plan  Previous admissions note substance abuse with K2, patient denies drug use other than marijuana when he can afford to purchase    Hyponatremia  Assessment & Plan  Pseudohyponatremia in setting of hyperglycemia         VTE Prophylaxis: Heparin  Code Status: Level 1 full code    Anticipated Length of Stay:   Patient will be admitted on an Inpatient basis with an anticipated length of stay of greater than 2 midnights.   Justification for Hospital Stay: Need for IV fluids and management of LUZ    Total Time for Visit, including Counseling / Coordination of Care: I have spent a total time of 76 minutes on 07/17/24 in caring for this patient including Diagnostic results, Risks and benefits of tx options, Instructions for management, Patient and family education, Importance of tx compliance, Impressions, Counseling / Coordination of care, Documenting in the medical record, Reviewing / ordering tests, medicine, procedures  , Obtaining or reviewing history  , and Communicating with other healthcare professionals .    Chief Complaint:   Intractable nausea and vomiting    History of Present Illness:    Obed Kelley is a 64 y.o. male past medical history of substance abuse, hypertension, poorly controlled type 2 diabetes presents to the hospital with several days of intractable nausea vomiting and poor p.o. intake.  Patient reports he had a melon over the weekend and has not been able to eat since that time.  He has had some fluids.  He denies any abdominal pain but does note some loose bowel movements.  He has had no fevers or chills.  Vomiting has been nonbloody nonbilious.  No hematochezia.  He reports no alcohol use, ongoing tobacco use, and intermittent marijuana use when he can afford to purchase. Denies other drug use.     Review of Systems:    Review of Systems   Constitutional:  Positive for activity change and appetite change. Negative for chills and fever.   HENT:  Negative for trouble swallowing.    Eyes:  Negative for visual disturbance.   Respiratory:  Negative for shortness of breath and wheezing.    Cardiovascular:  Negative for chest pain and palpitations.   Gastrointestinal:  Positive for diarrhea, nausea and vomiting. Negative for abdominal distention, abdominal pain and blood in stool.    Genitourinary:  Negative for difficulty urinating and dysuria.   Musculoskeletal:  Negative for arthralgias and myalgias.   Skin:  Negative for rash and wound.   Neurological:  Positive for weakness. Negative for dizziness, light-headedness and headaches.       Past Medical and Surgical History:     Past Medical History:   Diagnosis Date    Diabetes mellitus (HCC)        History reviewed. No pertinent surgical history.    Meds/Allergies:    Prior to Admission medications    Medication Sig Start Date End Date Taking? Authorizing Provider   atorvastatin (LIPITOR) 20 mg tablet Take 1 tablet (20 mg total) by mouth daily  Patient not taking: Reported on 7/17/2024 9/26/23   Jocelyn Gandhi MD   lisinopril (ZESTRIL) 5 mg tablet Take 1 tablet (5 mg total) by mouth daily  Patient not taking: Reported on 7/17/2024 9/26/23   Jocelyn Gandhi MD   metFORMIN (GLUCOPHAGE) 1000 MG tablet Take 0.5 tablets (500 mg total) by mouth 2 (two) times a day with meals  Patient not taking: Reported on 7/17/2024 9/26/23 7/17/24  Jocelyn Gandhi MD   nicotine (NICODERM CQ) 14 mg/24hr TD 24 hr patch Place 1 patch on the skin over 24 hours daily  Patient not taking: Reported on 7/17/2024 8/4/23   Lori Sandoval MD       Allergies: No Known Allergies    Social History:     Marital Status: Single   Substance Use History:   Social History     Substance and Sexual Activity   Alcohol Use Yes     Social History     Tobacco Use   Smoking Status Heavy Smoker    Current packs/day: 0.50    Types: Cigarettes   Smokeless Tobacco Never     Social History     Substance and Sexual Activity   Drug Use Yes    Comment: K2       Family History:    Pertinent family history reviewed    Physical Exam:     Vitals:   Blood Pressure: (!) 173/92 (07/17/24 1700)  Pulse: 88 (07/17/24 1700)  Temperature: (!) 97.3 °F (36.3 °C) (07/17/24 1700)  Temp Source: Temporal (07/17/24 1700)  Respirations: 20 (07/17/24 1700)  SpO2: 97 % (07/17/24 1700)    Physical Exam  Vitals  reviewed.   Constitutional:       General: He is not in acute distress.     Appearance: He is well-developed. He is not ill-appearing, toxic-appearing or diaphoretic.   HENT:      Head: Normocephalic and atraumatic.      Mouth/Throat:      Mouth: Mucous membranes are dry.   Eyes:      General: No scleral icterus.     Extraocular Movements: Extraocular movements intact.   Cardiovascular:      Rate and Rhythm: Normal rate and regular rhythm.      Heart sounds: Normal heart sounds.   Pulmonary:      Effort: Pulmonary effort is normal. No respiratory distress.      Breath sounds: Normal breath sounds. No wheezing or rales.   Abdominal:      General: There is no distension.      Palpations: Abdomen is soft.      Tenderness: There is no abdominal tenderness. There is no guarding or rebound.   Musculoskeletal:         General: No swelling, tenderness or deformity.   Skin:     General: Skin is warm and dry.   Neurological:      General: No focal deficit present.      Mental Status: He is alert. Mental status is at baseline.   Psychiatric:         Mood and Affect: Mood normal.         Behavior: Behavior normal.         Thought Content: Thought content normal.         Judgment: Judgment normal.          Additional Data:     Lab Results: I have reviewed pertinent results     Results from last 7 days   Lab Units 07/17/24  1558   WBC Thousand/uL 16.59*   HEMOGLOBIN g/dL 17.0   HEMATOCRIT % 48.0   PLATELETS Thousands/uL 191   BANDS PCT % 3   LYMPHO PCT % 3*   MONO PCT % 6   EOS PCT % 0     Results from last 7 days   Lab Units 07/17/24  1627   SODIUM mmol/L 127*   POTASSIUM mmol/L 5.3   CHLORIDE mmol/L 82*   CO2 mmol/L 28   BUN mg/dL 65*   CREATININE mg/dL 2.51*   ANION GAP mmol/L 17*   CALCIUM mg/dL 9.8   ALBUMIN g/dL 5.1*   TOTAL BILIRUBIN mg/dL 1.12*   ALK PHOS U/L 74   ALT U/L 35   AST U/L 92*   GLUCOSE RANDOM mg/dL 303*         Results from last 7 days   Lab Units 07/17/24  1547   POC GLUCOSE mg/dl 339*               Imaging:  I have reviewed pertinent imaging     No orders to display       EKG, Pathology, and Other Studies Reviewed on Admission:   EKG: Normal sinus rhythm, right bundle branch block    Allscripts / Epic Records Reviewed    ** Please Note: This note has been constructed using a voice recognition system. **

## 2024-07-17 NOTE — ASSESSMENT & PLAN NOTE
Patient reports he is no longer taking any medications  Hold lisinopril in setting of LUZ  IV labetalol as needed SBP greater than 170

## 2024-07-17 NOTE — ASSESSMENT & PLAN NOTE
Previous admissions note substance abuse with K2, patient denies drug use other than marijuana when he can afford to purchase

## 2024-07-17 NOTE — ED PROVIDER NOTES
History  Chief Complaint   Patient presents with    Abdominal Pain     NV after eating a melon. Yesterday afternoon.      Patient is a 64-year-old male with a h/o HTN, HL and DM who presents with a 2 day h/o nause and vomiting after eating a melon on his lunch break yesterday.  Unable to tolerate any po.  TNTC episodes of vomiting.  No bile, no bloody emesis.  No abdominal pain.  No diarrhea.  No fever.  No meds taken at home.          Prior to Admission Medications   Prescriptions Last Dose Informant Patient Reported? Taking?   atorvastatin (LIPITOR) 20 mg tablet Not Taking  No No   Sig: Take 1 tablet (20 mg total) by mouth daily   Patient not taking: Reported on 7/17/2024   lisinopril (ZESTRIL) 5 mg tablet Not Taking  No No   Sig: Take 1 tablet (5 mg total) by mouth daily   Patient not taking: Reported on 7/17/2024   metFORMIN (GLUCOPHAGE) 1000 MG tablet Not Taking  No No   Sig: Take 0.5 tablets (500 mg total) by mouth 2 (two) times a day with meals   Patient not taking: Reported on 7/17/2024   nicotine (NICODERM CQ) 14 mg/24hr TD 24 hr patch Not Taking  No No   Sig: Place 1 patch on the skin over 24 hours daily   Patient not taking: Reported on 7/17/2024      Facility-Administered Medications: None       Past Medical History:   Diagnosis Date    Diabetes mellitus (HCC)        History reviewed. No pertinent surgical history.    Family History   Problem Relation Age of Onset    Diabetes Mother      I have reviewed and agree with the history as documented.    E-Cigarette/Vaping    E-Cigarette Use Never User      E-Cigarette/Vaping Substances     Social History     Tobacco Use    Smoking status: Heavy Smoker     Current packs/day: 1.50     Types: Cigarettes    Smokeless tobacco: Never   Vaping Use    Vaping status: Never Used   Substance Use Topics    Alcohol use: Yes    Drug use: Yes     Comment: K2       Review of Systems   Constitutional:  Positive for appetite change.   HENT: Negative.     Eyes: Negative.     Respiratory: Negative.     Cardiovascular: Negative.    Gastrointestinal:  Positive for nausea and vomiting.   Endocrine: Negative.    Genitourinary: Negative.    Musculoskeletal: Negative.    Skin: Negative.    Allergic/Immunologic: Negative.    Neurological: Negative.    Hematological: Negative.    Psychiatric/Behavioral: Negative.     All other systems reviewed and are negative.      Physical Exam  Physical Exam  Vitals and nursing note reviewed.   Constitutional:       Appearance: He is well-developed.   HENT:      Head: Normocephalic and atraumatic.      Mouth/Throat:      Mouth: Mucous membranes are moist.      Pharynx: Oropharynx is clear.   Cardiovascular:      Rate and Rhythm: Normal rate.      Heart sounds: Normal heart sounds.   Pulmonary:      Effort: Pulmonary effort is normal.      Breath sounds: Normal breath sounds.   Abdominal:      General: Abdomen is flat. Bowel sounds are normal.      Palpations: Abdomen is soft.      Tenderness: There is no abdominal tenderness. There is no right CVA tenderness, left CVA tenderness, guarding or rebound. Negative signs include Vidal's sign.   Skin:     General: Skin is warm and dry.      Capillary Refill: Capillary refill takes less than 2 seconds.   Neurological:      General: No focal deficit present.      Mental Status: He is alert and oriented to person, place, and time.         Vital Signs  ED Triage Vitals   Temperature Pulse Respirations Blood Pressure SpO2   07/17/24 1543 07/17/24 1543 07/17/24 1543 07/17/24 1544 07/17/24 1543   98.1 °F (36.7 °C) 104 17 (!) 162/104 95 %      Temp Source Heart Rate Source Patient Position - Orthostatic VS BP Location FiO2 (%)   07/17/24 1543 -- 07/17/24 1700 07/17/24 1700 --   Oral  Lying Left arm       Pain Score       07/17/24 1543       4           Vitals:    07/17/24 1543 07/17/24 1544 07/17/24 1700 07/17/24 1840   BP:  (!) 162/104 (!) 173/92 164/92   Pulse: 104  88 88   Patient Position - Orthostatic VS:   Lying  Lying         Visual Acuity      ED Medications  Medications   sodium chloride 0.9 % infusion (150 mL/hr Intravenous New Bag 7/17/24 2058)   acetaminophen (TYLENOL) tablet 650 mg (has no administration in time range)   ondansetron (ZOFRAN) injection 4 mg (has no administration in time range)   aluminum-magnesium hydroxide-simethicone (MAALOX) oral suspension 30 mL (has no administration in time range)   nicotine (NICODERM CQ) 14 mg/24hr TD 24 hr patch 1 patch (has no administration in time range)   heparin (porcine) subcutaneous injection 5,000 Units (5,000 Units Subcutaneous Given 7/17/24 2209)   insulin lispro (HumALOG/ADMELOG) 100 units/mL subcutaneous injection 1-6 Units (3 Units Subcutaneous Given 7/17/24 2209)   insulin glargine (LANTUS) subcutaneous injection 10 Units 0.1 mL (10 Units Subcutaneous Given 7/17/24 2209)   pantoprazole (PROTONIX) injection 40 mg (40 mg Intravenous Given 7/17/24 2058)   sucralfate (CARAFATE) tablet 1 g (1 g Oral Given 7/17/24 2210)   labetalol (NORMODYNE) injection 10 mg (has no administration in time range)   ondansetron (ZOFRAN) injection 4 mg (4 mg Intravenous Given 7/17/24 1557)   sodium chloride 0.9 % bolus 1,000 mL (1,000 mL Intravenous New Bag 7/17/24 1705)       Diagnostic Studies  Results Reviewed       Procedure Component Value Units Date/Time    HS Troponin I 2hr [[id number]]  (Normal) Collected: 07/17/24 1839    Lab Status: Final result Specimen: Blood from Arm, Right Updated: 07/17/24 1905     hs TnI 2hr 15 ng/L      Delta 2hr hsTnI 0 ng/L     Hemoglobin A1C w/ EAG Estimation [[id number]] Collected: 07/17/24 1558    Lab Status: In process Specimen: Blood from Arm, Right Updated: 07/17/24 1902    HS Troponin I 4hr [[id number]] Collected: 07/17/24 1839    Lab Status: No result Specimen: Blood from Arm, Right     Beta Hydroxybutyrate [[id number]]  (Normal) Collected: 07/17/24 1627    Lab Status: Final result Specimen: Blood from Arm, Right Updated: 07/17/24 1817     Beta-  Hydroxybutyrate 0.21 mmol/L     Manual Differential(PHLEBS Do Not Order) [[id number]]  (Abnormal) Collected: 07/17/24 1558    Lab Status: Final result Specimen: Blood from Arm, Right Updated: 07/17/24 1747     Segmented % 86 %      Bands % 3 %      Lymphocytes % 3 %      Monocytes % 6 %      Eosinophils % 0 %      Basophils % 0 %      Atypical Lymphocytes % 2 %      Absolute Neutrophils 14.77 Thousand/uL      Absolute Lymphocytes 0.83 Thousand/uL      Absolute Monocytes 1.00 Thousand/uL      Absolute Eosinophils 0.00 Thousand/uL      Absolute Basophils 0.00 Thousand/uL      Total Counted --     RBC Morphology Normal     Platelet Estimate Adequate    Comprehensive metabolic panel [[id number]]  (Abnormal) Collected: 07/17/24 1627    Lab Status: Final result Specimen: Blood from Arm, Right Updated: 07/17/24 1659     Sodium 127 mmol/L      Potassium 5.3 mmol/L      Chloride 82 mmol/L      CO2 28 mmol/L      ANION GAP 17 mmol/L      BUN 65 mg/dL      Creatinine 2.51 mg/dL      Glucose 303 mg/dL      Calcium 9.8 mg/dL      AST 92 U/L      ALT 35 U/L      Alkaline Phosphatase 74 U/L      Total Protein 8.3 g/dL      Albumin 5.1 g/dL      Total Bilirubin 1.12 mg/dL      eGFR 26 ml/min/1.73sq m     Narrative:      National Kidney Disease Foundation guidelines for Chronic Kidney Disease (CKD):     Stage 1 with normal or high GFR (GFR > 90 mL/min/1.73 square meters)    Stage 2 Mild CKD (GFR = 60-89 mL/min/1.73 square meters)    Stage 3A Moderate CKD (GFR = 45-59 mL/min/1.73 square meters)    Stage 3B Moderate CKD (GFR = 30-44 mL/min/1.73 square meters)    Stage 4 Severe CKD (GFR = 15-29 mL/min/1.73 square meters)    Stage 5 End Stage CKD (GFR <15 mL/min/1.73 square meters)  Note: GFR calculation is accurate only with a steady state creatinine    Lipase [[id number]]  (Abnormal) Collected: 07/17/24 1627    Lab Status: Final result Specimen: Blood from Arm, Right Updated: 07/17/24 1659     Lipase 10 u/L     HS Troponin 0hr (reflex  protocol) [[id number]]  (Normal) Collected: 07/17/24 1558    Lab Status: Final result Specimen: Blood from Arm, Right Updated: 07/17/24 1628     hs TnI 0hr 15 ng/L     CBC and differential [[id number]]  (Abnormal) Collected: 07/17/24 1558    Lab Status: Final result Specimen: Blood from Arm, Right Updated: 07/17/24 1616     WBC 16.59 Thousand/uL      RBC 5.18 Million/uL      Hemoglobin 17.0 g/dL      Hematocrit 48.0 %      MCV 93 fL      MCH 32.8 pg      MCHC 35.4 g/dL      RDW 13.5 %      MPV 12.0 fL      Platelets 191 Thousands/uL     Fingerstick Glucose (POCT) [[id number]]  (Abnormal) Collected: 07/17/24 1547    Lab Status: Final result Specimen: Blood Updated: 07/17/24 1548     POC Glucose 339 mg/dl                    No orders to display              Procedures  Procedures         ED Course  ED Course as of 07/17/24 2224 Wed Jul 17, 2024   1702 Sodium(!): 127   1702 BUN(!): 65   1702 Creatinine(!): 2.51   1702 GFR, Calculated: 26   1702 GFR 11 months ago 83.     1711 Spoke with Dr. Mancera, inpt                                  SBIRT 22yo+      Flowsheet Row Most Recent Value   Initial Alcohol Screen: US AUDIT-C     1. How often do you have a drink containing alcohol? 0 Filed at: 07/17/2024 1544   2. How many drinks containing alcohol do you have on a typical day you are drinking?  0 Filed at: 07/17/2024 1544   3a. Male UNDER 65: How often do you have five or more drinks on one occasion? 0 Filed at: 07/17/2024 1544   Audit-C Score 0 Filed at: 07/17/2024 1544   MARIA C: How many times in the past year have you...    Used an illegal drug or used a prescription medication for non-medical reasons? Never Filed at: 07/17/2024 1544                      Medical Decision Making  TNTC vomiting for 2 days.  LUZ on labs here.  Significant change from labs 11 months ago.  Spoke with SLIM.  Will admit.     Amount and/or Complexity of Data Reviewed  External Data Reviewed: labs.     Details: Last BMP.  Labs: ordered.  Decision-making details documented in ED Course.  ECG/medicine tests: ordered and independent interpretation performed. Decision-making details documented in ED Course.    Risk  Prescription drug management.  Decision regarding hospitalization.                 Disposition  Final diagnoses:   LUZ (acute kidney injury) (HCC)   N&V (nausea and vomiting)     Time reflects when diagnosis was documented in both MDM as applicable and the Disposition within this note       Time User Action Codes Description Comment    7/17/2024  5:11 PM Nakul Coyle Add [N17.9] LUZ (acute kidney injury) (HCC)     7/17/2024  5:12 PM Nakul Coyle Add [R11.2] N&V (nausea and vomiting)           ED Disposition       ED Disposition   Admit    Condition   Stable    Date/Time   Wed Jul 17, 2024 1711    Comment   Case was discussed with Calderon and the patient's admission status was agreed to be Admission Status: inpatient status to the service of Dr. Mancera .               Follow-up Information    None         Current Discharge Medication List        CONTINUE these medications which have NOT CHANGED    Details   atorvastatin (LIPITOR) 20 mg tablet Take 1 tablet (20 mg total) by mouth daily  Qty: 90 tablet, Refills: 1    Associated Diagnoses: Type 2 diabetes mellitus with hyperglycemia, without long-term current use of insulin ([facility])      lisinopril (ZESTRIL) 5 mg tablet Take 1 tablet (5 mg total) by mouth daily  Qty: 90 tablet, Refills: 1    Associated Diagnoses: Type 2 diabetes mellitus with hyperglycemia, without long-term current use of insulin ([facility]); Microalbuminuria due to type 2 diabetes mellitus  ([facility])      metFORMIN (GLUCOPHAGE) 1000 MG tablet Take 0.5 tablets (500 mg total) by mouth 2 (two) times a day with meals  Qty: 90 tablet, Refills: 0    Associated Diagnoses: Type 2 diabetes mellitus without complication, without long-term current use of insulin ([facility])      nicotine (NICODERM CQ) 14 mg/24hr TD 24 hr patch Place 1 patch on the  skin over 24 hours daily  Qty: 28 patch, Refills: 0    Associated Diagnoses: Nicotine dependence             No discharge procedures on file.    PDMP Review       None            ED Provider  Electronically Signed by             Nakul Coyle MD  07/17/24 1218

## 2024-07-17 NOTE — ASSESSMENT & PLAN NOTE
Patient reports he had melon over the weekend and then has not been able to eat and has had minimal fluids since then  Suspect viral gastroenteritis  Patient does have a mild increase in bilirubin and will trend LFTs, if symptoms do not improve and LFTs trending up we will consider further imaging  Supportive care with IV fluids, IV protonix, carafate QID and zofran prn

## 2024-07-17 NOTE — ASSESSMENT & PLAN NOTE
Prerenal LUZ in setting of poor oral intake suspected secondary to a viral gastroenteritis  Given 1 L normal saline emergency department  Will continue normal saline overnight  Trend BMP

## 2024-07-17 NOTE — ASSESSMENT & PLAN NOTE
Suspect this is related to LUZ   Check lactic acid  For completeness and to not miss early DKA will check VBG and beta hydroxybutyrate

## 2024-07-17 NOTE — ASSESSMENT & PLAN NOTE
Lab Results   Component Value Date    HGBA1C 9.0 (H) 08/02/2023       Recent Labs     07/17/24  1547   POCGLU 339*       Blood Sugar Average: Last 72 hrs:  (P) 339    Hemoglobin A1c 9.0 approximately 1 year ago.  Patient reports he is not taking any medications at all.  Start basal bolus protocol while admitted. start 10 units of Lantus nightly plus sliding scale insulin  Will update hemoglobin A1c which will help guide treatment on discharge

## 2024-07-18 ENCOUNTER — APPOINTMENT (INPATIENT)
Dept: ULTRASOUND IMAGING | Facility: HOSPITAL | Age: 64
DRG: 469 | End: 2024-07-18
Payer: COMMERCIAL

## 2024-07-18 LAB
4HR DELTA HS TROPONIN: 2 NG/L
ALBUMIN SERPL BCG-MCNC: 4.6 G/DL (ref 3.5–5)
ALP SERPL-CCNC: 75 U/L (ref 34–104)
ALT SERPL W P-5'-P-CCNC: 38 U/L (ref 7–52)
ANION GAP SERPL CALCULATED.3IONS-SCNC: 14 MMOL/L (ref 4–13)
AST SERPL W P-5'-P-CCNC: 106 U/L (ref 13–39)
BILIRUB DIRECT SERPL-MCNC: 0.22 MG/DL (ref 0–0.2)
BILIRUB SERPL-MCNC: 1.05 MG/DL (ref 0.2–1)
BUN SERPL-MCNC: 53 MG/DL (ref 5–25)
CALCIUM SERPL-MCNC: 9.4 MG/DL (ref 8.4–10.2)
CARDIAC TROPONIN I PNL SERPL HS: 17 NG/L
CHLORIDE SERPL-SCNC: 90 MMOL/L (ref 96–108)
CO2 SERPL-SCNC: 28 MMOL/L (ref 21–32)
CREAT SERPL-MCNC: 1.57 MG/DL (ref 0.6–1.3)
ERYTHROCYTE [DISTWIDTH] IN BLOOD BY AUTOMATED COUNT: 13.5 % (ref 11.6–15.1)
EST. AVERAGE GLUCOSE BLD GHB EST-MCNC: 189 MG/DL
GFR SERPL CREATININE-BSD FRML MDRD: 45 ML/MIN/1.73SQ M
GLUCOSE SERPL-MCNC: 157 MG/DL (ref 65–140)
GLUCOSE SERPL-MCNC: 190 MG/DL (ref 65–140)
GLUCOSE SERPL-MCNC: 205 MG/DL (ref 65–140)
GLUCOSE SERPL-MCNC: 209 MG/DL (ref 65–140)
GLUCOSE SERPL-MCNC: 217 MG/DL (ref 65–140)
HBA1C MFR BLD: 8.2 %
HCT VFR BLD AUTO: 43.4 % (ref 36.5–49.3)
HGB BLD-MCNC: 16 G/DL (ref 12–17)
MCH RBC QN AUTO: 32.8 PG (ref 26.8–34.3)
MCHC RBC AUTO-ENTMCNC: 36.9 G/DL (ref 31.4–37.4)
MCV RBC AUTO: 89 FL (ref 82–98)
PLATELET # BLD AUTO: 166 THOUSANDS/UL (ref 149–390)
PMV BLD AUTO: 13 FL (ref 8.9–12.7)
POTASSIUM SERPL-SCNC: 4.5 MMOL/L (ref 3.5–5.3)
PROT SERPL-MCNC: 7.9 G/DL (ref 6.4–8.4)
RBC # BLD AUTO: 4.88 MILLION/UL (ref 3.88–5.62)
SODIUM SERPL-SCNC: 132 MMOL/L (ref 135–147)
WBC # BLD AUTO: 15.06 THOUSAND/UL (ref 4.31–10.16)

## 2024-07-18 PROCEDURE — 84484 ASSAY OF TROPONIN QUANT: CPT | Performed by: INTERNAL MEDICINE

## 2024-07-18 PROCEDURE — 80048 BASIC METABOLIC PNL TOTAL CA: CPT | Performed by: INTERNAL MEDICINE

## 2024-07-18 PROCEDURE — 76705 ECHO EXAM OF ABDOMEN: CPT

## 2024-07-18 PROCEDURE — 82948 REAGENT STRIP/BLOOD GLUCOSE: CPT

## 2024-07-18 PROCEDURE — 85027 COMPLETE CBC AUTOMATED: CPT | Performed by: INTERNAL MEDICINE

## 2024-07-18 PROCEDURE — 80076 HEPATIC FUNCTION PANEL: CPT | Performed by: INTERNAL MEDICINE

## 2024-07-18 PROCEDURE — 99232 SBSQ HOSP IP/OBS MODERATE 35: CPT | Performed by: INTERNAL MEDICINE

## 2024-07-18 RX ORDER — INSULIN GLARGINE 100 [IU]/ML
14 INJECTION, SOLUTION SUBCUTANEOUS
Status: DISCONTINUED | OUTPATIENT
Start: 2024-07-18 | End: 2024-07-19 | Stop reason: HOSPADM

## 2024-07-18 RX ORDER — AMLODIPINE BESYLATE 5 MG/1
5 TABLET ORAL DAILY
Status: DISCONTINUED | OUTPATIENT
Start: 2024-07-18 | End: 2024-07-19 | Stop reason: HOSPADM

## 2024-07-18 RX ORDER — INSULIN GLARGINE 100 [IU]/ML
15 INJECTION, SOLUTION SUBCUTANEOUS
Status: DISCONTINUED | OUTPATIENT
Start: 2024-07-18 | End: 2024-07-18

## 2024-07-18 RX ADMIN — HEPARIN SODIUM 5000 UNITS: 5000 INJECTION INTRAVENOUS; SUBCUTANEOUS at 21:52

## 2024-07-18 RX ADMIN — INSULIN LISPRO 2 UNITS: 100 INJECTION, SOLUTION INTRAVENOUS; SUBCUTANEOUS at 16:05

## 2024-07-18 RX ADMIN — SODIUM CHLORIDE 150 ML/HR: 0.9 INJECTION, SOLUTION INTRAVENOUS at 12:21

## 2024-07-18 RX ADMIN — HEPARIN SODIUM 5000 UNITS: 5000 INJECTION INTRAVENOUS; SUBCUTANEOUS at 05:55

## 2024-07-18 RX ADMIN — AMLODIPINE BESYLATE 5 MG: 5 TABLET ORAL at 09:12

## 2024-07-18 RX ADMIN — PANTOPRAZOLE SODIUM 40 MG: 40 INJECTION, POWDER, FOR SOLUTION INTRAVENOUS at 21:49

## 2024-07-18 RX ADMIN — NICOTINE 1 PATCH: 14 PATCH, EXTENDED RELEASE TRANSDERMAL at 08:16

## 2024-07-18 RX ADMIN — INSULIN LISPRO 2 UNITS: 100 INJECTION, SOLUTION INTRAVENOUS; SUBCUTANEOUS at 21:52

## 2024-07-18 RX ADMIN — LABETALOL HYDROCHLORIDE 10 MG: 5 INJECTION, SOLUTION INTRAVENOUS at 16:03

## 2024-07-18 RX ADMIN — SODIUM CHLORIDE 150 ML/HR: 0.9 INJECTION, SOLUTION INTRAVENOUS at 20:20

## 2024-07-18 RX ADMIN — SUCRALFATE 1 G: 1 TABLET ORAL at 06:00

## 2024-07-18 RX ADMIN — SODIUM CHLORIDE 150 ML/HR: 0.9 INJECTION, SOLUTION INTRAVENOUS at 03:21

## 2024-07-18 RX ADMIN — INSULIN GLARGINE 14 UNITS: 100 INJECTION, SOLUTION SUBCUTANEOUS at 21:52

## 2024-07-18 RX ADMIN — PANTOPRAZOLE SODIUM 40 MG: 40 INJECTION, POWDER, FOR SOLUTION INTRAVENOUS at 08:16

## 2024-07-18 RX ADMIN — LABETALOL HYDROCHLORIDE 10 MG: 5 INJECTION, SOLUTION INTRAVENOUS at 07:45

## 2024-07-18 RX ADMIN — INSULIN LISPRO 2 UNITS: 100 INJECTION, SOLUTION INTRAVENOUS; SUBCUTANEOUS at 06:01

## 2024-07-18 RX ADMIN — HEPARIN SODIUM 5000 UNITS: 5000 INJECTION INTRAVENOUS; SUBCUTANEOUS at 13:42

## 2024-07-18 RX ADMIN — SUCRALFATE 1 G: 1 TABLET ORAL at 16:03

## 2024-07-18 RX ADMIN — SUCRALFATE 1 G: 1 TABLET ORAL at 21:52

## 2024-07-18 NOTE — ASSESSMENT & PLAN NOTE
Prerenal LUZ in setting of poor oral intake suspected secondary to a viral gastroenteritis  Improving LUZ  Continue IV fluids

## 2024-07-18 NOTE — UTILIZATION REVIEW
NOTIFICATION OF INPATIENT MEDICAL ADMISSION   AUTHORIZATION REQUEST   SERVICING FACILITY:   [facility]  [address] [place] [address]  Tax ID: [id number]  NPI: [id number] ATTENDING PROVIDER:  Attending Name and NPI#: Ruy Mancera Do [[id number]]  Address: [address] [place] [address]  Phone: [phone]     ADMISSION INFORMATION:  Place of Service: Inpatient [facility]  Place of Service Code: 21  Inpatient Admission Date/Time: 7/17/24  5:12 PM  Discharge Date/Time: No discharge date for patient encounter.  Admitting Diagnosis Code/Description:  Abdominal pain [R10.9]  N&V (nausea and vomiting) [R11.2]  LUZ (acute kidney injury) (HCC) [N17.9]     UTILIZATION REVIEW CONTACT:  Ada Granados, Utilization   Network Utilization Review Department  Phone: [phone]  Fax [phone]  Email: [email or web address]  Contact for approvals/pending authorizations, clinical reviews, and discharge.     PHYSICIAN ADVISORY SERVICES:  Medical Necessity Denial & Sjzh-mz-Ftna Review  Phone: [phone]  Fax: [phone]  Email: [email or web address]     DISCHARGE SUPPORT TEAM:  For Patients Discharge Needs & Updates  Phone: [phone] opt. 2 Fax: [phone]  Email: [email or web address]

## 2024-07-18 NOTE — UTILIZATION REVIEW
Initial Clinical Review    Admission: Date/Time/Statement:   Admission Orders (From admission, onward)       Ordered        07/17/24 1712  INPATIENT ADMISSION  Once                          Orders Placed This Encounter   Procedures    INPATIENT ADMISSION     Standing Status:   Standing     Number of Occurrences:   1     Order Specific Question:   Level of Care     Answer:   Med Surg [16]     Order Specific Question:   Estimated length of stay     Answer:   More than 2 Midnights     Order Specific Question:   Certification     Answer:   I certify that inpatient services are medically necessary for this patient for a duration of greater than two midnights. See H&P and MD Progress Notes for additional information about the patient's course of treatment.     ED Arrival Information       Expected   -    Arrival   7/17/2024 15:37    Acuity   Urgent              Means of arrival   Walk-In    Escorted by   Self    Service   Hospitalist    Admission type   Emergency              Arrival complaint   vomiting,bodyaches, diarrhea             Chief Complaint   Patient presents with    Abdominal Pain     NV after eating a melon. Yesterday afternoon.        Initial Presentation: 64 y.o. male who presented self from home to [facility] ED. Inpatient admission for evaluation and treatment of LUZ. PMHx: T2DM, HTN, substance use. Presented w/ intractable n/v for several days, poor PO intake. Notes loose BMs. Emesis nonbloody/nonbilious. On exam, dry mucous membranes. Crt 2.51. Plan: IVF, Trend labs, replete electrolytes as needed; IV PPI, carafate QID, antiemetics PRN; Lantus, SSI w/ BG cgekcs ACHS, check HgbA1c.     Anticipated Length of Stay/Certification Statement: Patient will be admitted on an Inpatient basis with an anticipated length of stay of greater than 2 midnights.   Justification for Hospital Stay: Need for IV fluids and management of LUZ     Date: 07/18/24   Day 2: Reports feeling improved but nauseous. Exam  unremarkable. Plan: IVF, hold lisinopril, PRN labetalol, Trend labs, replete electrolytes as needed. Check RUQ US s/t elevated bili. Trend LFTs. SSI w/ BG checks ACHS, Lantus increased to 14 units qHS. Supportive care.     ED Triage Vitals   Temperature Pulse Respirations Blood Pressure SpO2 Pain Score   07/17/24 1543 07/17/24 1543 07/17/24 1543 07/17/24 1544 07/17/24 1543 07/17/24 1543   98.1 °F (36.7 °C) 104 17 (!) 162/104 95 % 4     Weight (last 2 days)       Date/Time Weight    07/17/24 1840 78 (172)            Vital Signs (last 3 days)       Date/Time Temp Pulse Resp BP MAP (mmHg) SpO2 O2 Device GCS Pain    07/18/24 0731 -- -- -- 180/110 -- -- -- -- --    07/18/24 0715 96.5 °F (35.8 °C) 90 18 175/102 116 95 % None (Room air) -- --    07/17/24 2308 97.8 °F (36.6 °C) 92 18 169/101 125 96 % None (Room air) -- --    07/17/24 2000 -- -- -- -- -- -- -- 15 2    07/17/24 1840 97.4 °F (36.3 °C) 88 20 164/92 112 -- -- -- 2    07/17/24 1700 97.3 °F (36.3 °C) 88 20 173/92 123 97 % None (Room air) -- --    07/17/24 1544 -- -- -- 162/104 -- -- -- -- --    07/17/24 1543 98.1 °F (36.7 °C) 104 17 -- -- 95 % None (Room air) -- 4              Pertinent Labs/Diagnostic Test Results  Cardiology:  ECG 12 lead   Final Result by Lenny Ruano MD (07/17 2240)   Normal sinus rhythm   Right bundle branch block   Inferior infarct (cited on or before 01-FEB-2021)   Abnormal ECG   When compared with ECG of 02-AUG-2023 12:42,   Left posterior fascicular block is no longer Present   Nonspecific T wave abnormality no longer evident in Anterior leads   Confirmed by Lenny Ruano ([id number]) on 7/17/2024 10:40:29 PM          Results from last 7 days   Lab Units 07/18/24  0428 07/17/24  1558   WBC Thousand/uL 15.06* 16.59*   HEMOGLOBIN g/dL 16.0 17.0   HEMATOCRIT % 43.4 48.0   PLATELETS Thousands/uL 166 191   BANDS PCT %  --  3         Results from last 7 days   Lab Units 07/18/24  0428 07/17/24  1627   SODIUM mmol/L 132* 127*   POTASSIUM  mmol/L 4.5 5.3   CHLORIDE mmol/L 90* 82*   CO2 mmol/L 28 28   ANION GAP mmol/L 14* 17*   BUN mg/dL 53* 65*   CREATININE mg/dL 1.57* 2.51*   EGFR ml/min/1.73sq m 45 26   CALCIUM mg/dL 9.4 9.8     Results from last 7 days   Lab Units 07/18/24  0428 07/17/24  1627   AST U/L 106* 92*   ALT U/L 38 35   ALK PHOS U/L 75 74   TOTAL PROTEIN g/dL 7.9 8.3   ALBUMIN g/dL 4.6 5.1*   TOTAL BILIRUBIN mg/dL 1.05* 1.12*   BILIRUBIN DIRECT mg/dL 0.22*  --      Results from last 7 days   Lab Units 07/18/24  1021 07/18/24  0534 07/17/24  2049 07/17/24  1547   POC GLUCOSE mg/dl 209* 217* 237* 339*     Results from last 7 days   Lab Units 07/18/24  0428 07/17/24  1627   GLUCOSE RANDOM mg/dL 157* 303*         Results from last 7 days   Lab Units 07/17/24  1558   HEMOGLOBIN A1C % 8.0*   EAG mg/dl 183     Beta- Hydroxybutyrate   Date Value Ref Range Status   07/17/2024 0.21 0.02 - 0.27 mmol/L Final      Results from last 7 days   Lab Units 07/18/24  0024 07/17/24  1839 07/17/24  1558   HS TNI 0HR ng/L  --   --  15   HS TNI 2HR ng/L  --  15  --    HSTNI D2 ng/L  --  0  --    HS TNI 4HR ng/L 17  --   --    HSTNI D4 ng/L 2  --   --      Results from last 7 days   Lab Units 07/17/24  2045   LACTIC ACID mmol/L 1.3     Results from last 7 days   Lab Units 07/17/24  1627   LIPASE u/L 10*         ED Treatment-Medication Administration from 07/17/2024 1537 to 07/17/2024 1736         Date/Time Order Dose Route Action     07/17/2024 1557 ondansetron (ZOFRAN) injection 4 mg 4 mg Intravenous Given     07/17/2024 1705 sodium chloride 0.9 % bolus 1,000 mL 1,000 mL Intravenous New Bag            Past Medical History:   Diagnosis Date    Diabetes mellitus (HCC)      Present on Admission:   Hyponatremia   Diabetes mellitus (HCC)   Substance abuse (HCC)   LUZ (acute kidney injury) (HCC)      Admitting Diagnosis: Abdominal pain [R10.9]  N&V (nausea and vomiting) [R11.2]  LUZ (acute kidney injury) (HCC) [N17.9]  Age/Sex: 64 y.o. male  Admission  Orders:  Consistent Carbohydrate Diet.  Blood glucose checks ACHS.  Up & OOB as tolerated.  I&O.    Scheduled Medications:  amLODIPine, 5 mg, Oral, Daily  heparin (porcine), 5,000 Units, Subcutaneous, Q8H EVANGELINA  insulin glargine, 14 Units, Subcutaneous, HS  insulin lispro, 1-6 Units, Subcutaneous, 4x Daily (AC & HS)  nicotine, 1 patch, Transdermal, Daily  pantoprazole, 40 mg, Intravenous, Q12H EVANGELINA  sucralfate, 1 g, Oral, 4x Daily (AC & HS)    Continuous IV Infusions:  sodium chloride, 150 mL/hr, Intravenous, Continuous    PRN Meds:  acetaminophen, 650 mg, Oral, Q6H PRN  aluminum-magnesium hydroxide-simethicone, 30 mL, Oral, Q6H PRN  labetalol, 10 mg, Intravenous, Q4H PRN; 7/18 x1  ondansetron, 4 mg, Intravenous, Q6H PRN        IP CONSULT TO CASE MANAGEMENT    Network Utilization Review Department  ATTENTION: Please call with any questions or concerns to [phone] and carefully listen to the prompts so that you are directed to the right person. All voicemails are confidential.   For Discharge needs, contact Care Management DC Support Team at [phone] opt. 2  Send all requests for admission clinical reviews, approved or denied determinations and any other requests to dedicated fax number below belonging to the campus where the patient is receiving treatment. List of dedicated fax numbers for the Facilities:  FACILITY NAME UR FAX NUMBER   ADMISSION DENIALS (Administrative/Medical Necessity) [phone]   DISCHARGE SUPPORT TEAM (NETWORK) [phone]   PARENT CHILD HEALTH (Maternity/NICU/Pediatrics) [phone]   [facility] [phone]   [facility] [phone]   [facility] [phone]   General acute hospital [phone]   [facility] [phone]   [facility] [phone]   [facility] [phone]   [facility]  [place] [phone]   [facility] [phone]   [facility] [phone]   [facility] [phone]   [facility] [phone]

## 2024-07-18 NOTE — PROGRESS NOTES
[facility]  Progress Note  Name: Obed Kelley I  MRN: [id number]  Unit/Bed#: 7T [facility] 705-01 I Date of Admission: 7/17/2024   Date of Service: 7/18/2024 I Hospital Day: 1    Assessment & Plan   * LUZ (acute kidney injury) (HCC)  Assessment & Plan  Prerenal LUZ in setting of poor oral intake suspected secondary to a viral gastroenteritis  Improving LUZ  Continue IV fluids    Hypertension  Assessment & Plan  Patient reports he is no longer taking any medications  Hold lisinopril in setting of LUZ  IV labetalol as needed SBP greater than 170    Intractable nausea and vomiting  Assessment & Plan  Patient reports he had melon over the weekend and then has not been able to eat and has had minimal fluids since then  Suspect viral gastroenteritis  Improving with supportive care IV fluids, IV Protonix, Carafate, Zofran  Still with elevated bili, will check right upper quadrant ultrasound to rule out gallbladder pathology  Trend LFTs    High anion gap metabolic acidosis  Assessment & Plan  Suspect this is related to LUZ   Resolving    Diabetes mellitus (HCC)  Assessment & Plan  Lab Results   Component Value Date    HGBA1C 8.0 (H) 07/17/2024       Recent Labs     07/17/24  1547 07/17/24  2049 07/18/24  0534 07/18/24  1021   POCGLU 339* 237* 217* 209*         Blood Sugar Average: Last 72 hrs:  (P) 250.5    Repeat hemoglobin A1c 8  Recommend resuming metformin on discharge  For now continue basal bolus protocol.  Increase Lantus to 14 units nightly      Substance abuse (HCC)  Assessment & Plan  Previous admissions note substance abuse with K2, patient denies drug use other than marijuana when he can afford to purchase    Hyponatremia  Assessment & Plan  Pseudohyponatremia in setting of hyperglycemia           VTE Pharmacologic Prophylaxis: heparin     Patient Centered Rounds:  Patient care rounds were performed with nursing    Education and Discussions with Family / Patient: Patient      Time Spent for Care: I have spent a total time of 46 minutes on 24 in caring for this patient including Diagnostic results, Risks and benefits of tx options, Instructions for management, Patient and family education, Importance of tx compliance, Impressions, Documenting in the medical record, Reviewing / ordering tests, medicine, procedures  , Obtaining or reviewing history  , and Communicating with other healthcare professionals .      Current Length of Stay: 1 day(s)    Current Patient Status: Inpatient   Certification Statement: The patient will continue to require additional inpatient hospital stay due to need for IVF management of intractable vomiting, brendon     Discharge Plan: 24 hours     Code Status: Level 1 - Full Code      Subjective:   Patient seen and evaluated at bedside.  No overnight events.  Reports he is feeling improved but still nauseous.    Objective:     Vitals:   Temp (24hrs), Av.6 °F (36.4 °C), Min:97.3 °F (36.3 °C), Max:98.1 °F (36.7 °C)    Temp:  [97.3 °F (36.3 °C)-98.1 °F (36.7 °C)] 97.5 °F (36.4 °C)  HR:  [] 83  Resp:  [17-20] 18  BP: (158-180)/() 158/90  SpO2:  [95 %-97 %] 95 %  Body mass index is 23.99 kg/m².     Input and Output Summary (last 24 hours):       Intake/Output Summary (Last 24 hours) at 2024 1154  Last data filed at 2024 0823  Gross per 24 hour   Intake 2307.5 ml   Output --   Net 2307.5 ml       Physical Exam:     Physical Exam  Vitals reviewed.   Constitutional:       General: He is not in acute distress.     Appearance: He is well-developed. He is not ill-appearing, toxic-appearing or diaphoretic.   HENT:      Head: Normocephalic and atraumatic.      Mouth/Throat:      Mouth: Mucous membranes are moist.   Eyes:      General: No scleral icterus.     Extraocular Movements: Extraocular movements intact.   Cardiovascular:      Rate and Rhythm: Normal rate and regular rhythm.      Heart sounds: Normal heart sounds.   Pulmonary:      Effort:  Pulmonary effort is normal. No respiratory distress.      Breath sounds: Normal breath sounds. No wheezing or rales.   Abdominal:      General: There is no distension.      Palpations: Abdomen is soft.      Tenderness: There is no abdominal tenderness. There is no guarding or rebound.   Musculoskeletal:         General: No swelling, tenderness or deformity.   Skin:     General: Skin is warm and dry.   Neurological:      General: No focal deficit present.      Mental Status: He is alert. Mental status is at baseline.   Psychiatric:         Mood and Affect: Mood normal.         Behavior: Behavior normal.         Thought Content: Thought content normal.         Judgment: Judgment normal.         Additional Data:     Labs: I have reviewed pertinent results     Results from last 7 days   Lab Units 07/18/24  0428 07/17/24  1558   WBC Thousand/uL 15.06* 16.59*   HEMOGLOBIN g/dL 16.0 17.0   HEMATOCRIT % 43.4 48.0   PLATELETS Thousands/uL 166 191   BANDS PCT %  --  3   LYMPHO PCT %  --  3*   MONO PCT %  --  6   EOS PCT %  --  0     Results from last 7 days   Lab Units 07/18/24  0428   SODIUM mmol/L 132*   POTASSIUM mmol/L 4.5   CHLORIDE mmol/L 90*   CO2 mmol/L 28   BUN mg/dL 53*   CREATININE mg/dL 1.57*   ANION GAP mmol/L 14*   CALCIUM mg/dL 9.4   ALBUMIN g/dL 4.6   TOTAL BILIRUBIN mg/dL 1.05*   ALK PHOS U/L 75   ALT U/L 38   AST U/L 106*   GLUCOSE RANDOM mg/dL 157*         Results from last 7 days   Lab Units 07/18/24  1021 07/18/24  0534 07/17/24  2049 07/17/24  1547   POC GLUCOSE mg/dl 209* 217* 237* 339*     Results from last 7 days   Lab Units 07/17/24  1558   HEMOGLOBIN A1C % 8.0*     Results from last 7 days   Lab Units 07/17/24  2045   LACTIC ACID mmol/L 1.3         Imaging: I have reviewed pertinent imaging       Recent Cultures (last 7 days):           Last 24 Hours Medication List:   Current Facility-Administered Medications   Medication Dose Route Frequency Provider Last Rate    acetaminophen  650 mg Oral Q6H PRN  Ruy Mancera DO      aluminum-magnesium hydroxide-simethicone  30 mL Oral Q6H PRN Ruy Mancera DO      amLODIPine  5 mg Oral Daily Ruy Mancera DO      heparin (porcine)  5,000 Units Subcutaneous Q8H [facility] Ruy Mancera DO      insulin glargine  14 Units Subcutaneous HS Ruy Mancera DO      insulin lispro  1-6 Units Subcutaneous 4x Daily (AC & HS) Ruy Mancera DO      labetalol  10 mg Intravenous Q4H PRN Ruy Mancera DO      nicotine  1 patch Transdermal Daily Ruy Mancera DO      ondansetron  4 mg Intravenous Q6H PRN Ruy Mancera DO      pantoprazole  40 mg Intravenous Q12H [facility] Ruy Mancera DO      sodium chloride  150 mL/hr Intravenous Continuous Ruy Mancera  mL/hr (07/18/24 0321)    sucralfate  1 g Oral 4x Daily (AC & HS) Ruy Mancera DO          Today, Patient Was Seen By: Ruy Mancera DO    ** Please Note: Dictation voice to text software may have been used in the creation of this document. **

## 2024-07-18 NOTE — CASE MANAGEMENT
Case Management Assessment & Discharge Planning Note    Patient name Obed Kelley  Location 7T U 705/7T U 705-01 MRN [id number]  : 1960 Date 2024       Current Admission Date: 2024  Current Admission Diagnosis:LUZ (acute kidney injury) (HCC)   Patient Active Problem List    Diagnosis Date Noted Date Diagnosed    High anion gap metabolic acidosis 2024     Intractable nausea and vomiting 2024     Hypertension 2024     Impotence 2023     Ambulatory dysfunction 2023     Arthritis 2023     Microalbuminuria due to type 2 diabetes mellitus  ([facility]) 08/15/2023     Hyponatremia 2023     LUZ (acute kidney injury) ([facility]) 2023     Substance abuse ([facility]) 2023     Diabetes mellitus ([facility]) 2023       LOS (days): 1  Geometric Mean LOS (GMLOS) (days): 3.1  Days to GMLOS:2.2     OBJECTIVE:    Risk of Unplanned Readmission Score: 17.46         Current admission status: Inpatient  Referral Reason: Follow Up, Medication Assistance, Other (PCP)    Preferred Pharmacy:    [facility] [place] [id number]  Phone: [phone] Fax: [phone]    Primary Care Provider: Jocelyn Gandhi MD    Primary Insurance: [employer]  Secondary Insurance:     ASSESSMENT:  Active Health Care Proxies    There are no active Health Care Proxies on file.       Readmission Root Cause  30 Day Readmission: No    Patient Information  Admitted from:: Home  Mental Status: Alert  During Assessment patient was accompanied by: Not accompanied during assessment  Assessment information provided by:: Patient  Primary Caregiver: Self  Support Systems: Family members, Self  County of Residence: [place]  What city do you live in?: [place]  Home entry access options. Select all that apply.: No steps to enter home  Type of Current Residence: 2 story home  Upon entering residence, is there a bedroom on the main floor (no  further steps)?: No  A bedroom is located on the following floor levels of residence (select all that apply):: 2nd Floor  Upon entering residence, is there a bathroom on the main floor (no further steps)?: Yes  Number of steps to 2nd floor from main floor: One Flight  Living Arrangements: Lives w/ Extended Family  Is patient a ?: No    Activities of Daily Living Prior to Admission  Functional Status: Independent  Completes ADLs independently?: Yes  Ambulates independently?: Yes  Does patient use assisted devices?: No  Does patient currently own DME?: No  Does patient have a history of Outpatient Therapy (PT/OT)?: No  Does the patient have a history of Short-Term Rehab?: No  Does patient have a history of HHC?: No  Does patient currently have HHC?: No      Patient Information Continued  Income Source: Unknown  Does patient have prescription coverage?: Yes  Does patient receive dialysis treatments?: No  Does patient have a history of substance abuse?: Yes (per chart review usus K2)  History of Withdrawal Symptoms: Denies past symptoms  Is patient currently in treatment for substance abuse?: No. Patient declined treatment information.  Does patient have a history of Mental Health Diagnosis?: No      Means of Transportation  Means of Transport to Appts:: None      Social Determinants of Health (SDOH)      Flowsheet Row Most Recent Value   Housing Stability    In the last 12 months, was there a time when you were not able to pay the mortgage or rent on time? N   In the past 12 months, how many times have you moved where you were living? 0   At any time in the past 12 months, were you homeless or living in a shelter (including now)? N   Transportation Needs    In the past 12 months, has lack of transportation kept you from medical appointments or from getting medications? no   In the past 12 months, has lack of transportation kept you from meetings, work, or from getting things needed for daily living? No   Food  Insecurity    Within the past 12 months, you worried that your food would run out before you got the money to buy more. Never true   Within the past 12 months, the food you bought just didn't last and you didn't have money to get more. Never true   Utilities    In the past 12 months has the electric, gas, oil, or water company threatened to shut off services in your home? No          DISCHARGE DETAILS:    Discharge planning discussed with:: Patient  Freedom of Choice: Yes     CM contacted family/caregiver?: No- see comments  Were Treatment Team discharge recommendations reviewed with patient/caregiver?: Yes  Did patient/caregiver verbalize understanding of patient care needs?: Yes      Requested Home Health Care         Is the patient interested in HHC at discharge?: No    DME Referral Provided  Referral made for DME?: No    Other Referral/Resources/Interventions Provided:  Interventions: Other (Specify) (PCP appointment)    Would you like to participate in our [facility] service program?  : Yes    Treatment Team Recommendation: Home          Additional Comments: Met with patient at bedside using [employer] for [place]ian interpretation.  Elizabet [id number].  Patient has PCP at [facility].  Uses [facility] [place].    Call to [facility] and talked to Vanessa.  Scheduled F/U appointment with Dr Gottlieb for 7/30 at 140pm.      Will need LYFT transport home.    CM department following thru discharge

## 2024-07-18 NOTE — PLAN OF CARE
Problem: PAIN - ADULT  Goal: Verbalizes/displays adequate comfort level or baseline comfort level  Description: Interventions:  - Encourage patient to monitor pain and request assistance  - Assess pain using appropriate pain scale  - Administer analgesics based on type and severity of pain and evaluate response  - Implement non-pharmacological measures as appropriate and evaluate response  - Consider cultural and social influences on pain and pain management  - Notify physician/advanced practitioner if interventions unsuccessful or patient reports new pain  Outcome: Progressing     Problem: INFECTION - ADULT  Goal: Absence or prevention of progression during hospitalization  Description: INTERVENTIONS:  - Assess and monitor for signs and symptoms of infection  - Monitor lab/diagnostic results  - Monitor all insertion sites, i.e. indwelling lines, tubes, and drains  - Monitor endotracheal if appropriate and nasal secretions for changes in amount and color  - [place] appropriate cooling/warming therapies per order  - Administer medications as ordered  - Instruct and encourage patient and family to use good hand hygiene technique  - Identify and instruct in appropriate isolation precautions for identified infection/condition  Outcome: Progressing     Problem: GASTROINTESTINAL - ADULT  Goal: Minimal or absence of nausea and/or vomiting  Description: INTERVENTIONS:  - Administer IV fluids if ordered to ensure adequate hydration  - Maintain NPO status until nausea and vomiting are resolved  - Nasogastric tube if ordered  - Administer ordered antiemetic medications as needed  - Provide nonpharmacologic comfort measures as appropriate  - Advance diet as tolerated, if ordered  - Consider nutrition services referral to assist patient with adequate nutrition and appropriate food choices  Outcome: Progressing

## 2024-07-18 NOTE — PLAN OF CARE
Problem: METABOLIC, FLUID AND ELECTROLYTES - ADULT  Goal: Electrolytes maintained within normal limits  Description: INTERVENTIONS:  - Monitor labs and assess patient for signs and symptoms of electrolyte imbalances  - Administer electrolyte replacement as ordered  - Monitor response to electrolyte replacements, including repeat lab results as appropriate  - Instruct patient on fluid and nutrition as appropriate  Outcome: Progressing  Goal: Fluid balance maintained  Description: INTERVENTIONS:  - Monitor labs   - Monitor I/O and WT  - Instruct patient on fluid and nutrition as appropriate  - Assess for signs & symptoms of volume excess or deficit  Outcome: Progressing  Goal: Glucose maintained within target range  Description: INTERVENTIONS:  - Monitor Blood Glucose as ordered  - Assess for signs and symptoms of hyperglycemia and hypoglycemia  - Administer ordered medications to maintain glucose within target range  - Assess nutritional intake and initiate nutrition service referral as needed  Outcome: Progressing     Problem: Knowledge Deficit  Goal: Patient/family/caregiver demonstrates understanding of disease process, treatment plan, medications, and discharge instructions  Description: Complete learning assessment and assess knowledge base.  Interventions:  - Provide teaching at level of understanding  - Provide teaching via preferred learning methods  Outcome: Progressing     Problem: DISCHARGE PLANNING  Goal: Discharge to home or other facility with appropriate resources  Description: INTERVENTIONS:  - Identify barriers to discharge w/patient and caregiver  - Arrange for needed discharge resources and transportation as appropriate  - Identify discharge learning needs (meds, wound care, etc.)  - Arrange for interpretive services to assist at discharge as needed  - Refer to Case Management Department for coordinating discharge planning if the patient needs post-hospital services based on physician/advanced  practitioner order or complex needs related to functional status, cognitive ability, or social support system  Outcome: Progressing

## 2024-07-18 NOTE — ASSESSMENT & PLAN NOTE
Lab Results   Component Value Date    HGBA1C 8.0 (H) 07/17/2024       Recent Labs     07/17/24  1547 07/17/24  2049 07/18/24  0534 07/18/24  1021   POCGLU 339* 237* 217* 209*         Blood Sugar Average: Last 72 hrs:  (P) 250.5    Repeat hemoglobin A1c 8  Recommend resuming metformin on discharge  For now continue basal bolus protocol.  Increase Lantus to 14 units nightly

## 2024-07-18 NOTE — ASSESSMENT & PLAN NOTE
Patient reports he had melon over the weekend and then has not been able to eat and has had minimal fluids since then  Suspect viral gastroenteritis  Improving with supportive care IV fluids, IV Protonix, Carafate, Zofran  Still with elevated bili, will check right upper quadrant ultrasound to rule out gallbladder pathology  Trend LFTs

## 2024-07-19 ENCOUNTER — TRANSITIONAL CARE MANAGEMENT (OUTPATIENT)
Dept: FAMILY MEDICINE CLINIC | Facility: CLINIC | Age: 64
End: 2024-07-19

## 2024-07-19 VITALS
SYSTOLIC BLOOD PRESSURE: 157 MMHG | TEMPERATURE: 98 F | HEART RATE: 83 BPM | RESPIRATION RATE: 18 BRPM | OXYGEN SATURATION: 98 % | DIASTOLIC BLOOD PRESSURE: 95 MMHG | HEIGHT: 71 IN | WEIGHT: 172 LBS | BODY MASS INDEX: 24.08 KG/M2

## 2024-07-19 LAB
ALBUMIN SERPL BCG-MCNC: 3.7 G/DL (ref 3.5–5)
ALP SERPL-CCNC: 61 U/L (ref 34–104)
ALT SERPL W P-5'-P-CCNC: 38 U/L (ref 7–52)
ANION GAP SERPL CALCULATED.3IONS-SCNC: 8 MMOL/L (ref 4–13)
AST SERPL W P-5'-P-CCNC: 68 U/L (ref 13–39)
BILIRUB SERPL-MCNC: 1.17 MG/DL (ref 0.2–1)
BUN SERPL-MCNC: 16 MG/DL (ref 5–25)
CALCIUM SERPL-MCNC: 8.3 MG/DL (ref 8.4–10.2)
CHLORIDE SERPL-SCNC: 101 MMOL/L (ref 96–108)
CO2 SERPL-SCNC: 27 MMOL/L (ref 21–32)
CREAT SERPL-MCNC: 0.8 MG/DL (ref 0.6–1.3)
ERYTHROCYTE [DISTWIDTH] IN BLOOD BY AUTOMATED COUNT: 13.6 % (ref 11.6–15.1)
GFR SERPL CREATININE-BSD FRML MDRD: 94 ML/MIN/1.73SQ M
GLUCOSE SERPL-MCNC: 114 MG/DL (ref 65–140)
GLUCOSE SERPL-MCNC: 123 MG/DL (ref 65–140)
HCT VFR BLD AUTO: 43.4 % (ref 36.5–49.3)
HGB BLD-MCNC: 14.6 G/DL (ref 12–17)
MCH RBC QN AUTO: 32.7 PG (ref 26.8–34.3)
MCHC RBC AUTO-ENTMCNC: 33.6 G/DL (ref 31.4–37.4)
MCV RBC AUTO: 97 FL (ref 82–98)
PLATELET # BLD AUTO: 112 THOUSANDS/UL (ref 149–390)
PMV BLD AUTO: 12.7 FL (ref 8.9–12.7)
POTASSIUM SERPL-SCNC: 3.6 MMOL/L (ref 3.5–5.3)
PROT SERPL-MCNC: 6.3 G/DL (ref 6.4–8.4)
RBC # BLD AUTO: 4.47 MILLION/UL (ref 3.88–5.62)
SODIUM SERPL-SCNC: 136 MMOL/L (ref 135–147)
WBC # BLD AUTO: 8.5 THOUSAND/UL (ref 4.31–10.16)

## 2024-07-19 PROCEDURE — 85027 COMPLETE CBC AUTOMATED: CPT | Performed by: INTERNAL MEDICINE

## 2024-07-19 PROCEDURE — 80053 COMPREHEN METABOLIC PANEL: CPT | Performed by: INTERNAL MEDICINE

## 2024-07-19 PROCEDURE — 82948 REAGENT STRIP/BLOOD GLUCOSE: CPT

## 2024-07-19 PROCEDURE — 99239 HOSP IP/OBS DSCHRG MGMT >30: CPT | Performed by: INTERNAL MEDICINE

## 2024-07-19 RX ORDER — ATORVASTATIN CALCIUM 20 MG/1
20 TABLET, FILM COATED ORAL DAILY
Qty: 90 TABLET | Refills: 0 | Status: SHIPPED | OUTPATIENT
Start: 2024-07-19

## 2024-07-19 RX ORDER — NICOTINE 21 MG/24HR
1 PATCH, TRANSDERMAL 24 HOURS TRANSDERMAL DAILY
Qty: 28 PATCH | Refills: 0 | Status: SHIPPED | OUTPATIENT
Start: 2024-07-19

## 2024-07-19 RX ORDER — LISINOPRIL 5 MG/1
5 TABLET ORAL DAILY
Qty: 90 TABLET | Refills: 0 | Status: SHIPPED | OUTPATIENT
Start: 2024-07-19

## 2024-07-19 RX ADMIN — SODIUM CHLORIDE 150 ML/HR: 0.9 INJECTION, SOLUTION INTRAVENOUS at 01:47

## 2024-07-19 RX ADMIN — HEPARIN SODIUM 5000 UNITS: 5000 INJECTION INTRAVENOUS; SUBCUTANEOUS at 06:05

## 2024-07-19 RX ADMIN — NICOTINE 1 PATCH: 14 PATCH, EXTENDED RELEASE TRANSDERMAL at 08:11

## 2024-07-19 RX ADMIN — PANTOPRAZOLE SODIUM 40 MG: 40 INJECTION, POWDER, FOR SOLUTION INTRAVENOUS at 08:08

## 2024-07-19 RX ADMIN — SUCRALFATE 1 G: 1 TABLET ORAL at 06:05

## 2024-07-19 RX ADMIN — AMLODIPINE BESYLATE 5 MG: 5 TABLET ORAL at 08:09

## 2024-07-19 NOTE — ASSESSMENT & PLAN NOTE
Patient reports he is no longer taking any medications  Hypertensive urgency which improved with symptomatic treatment and amlodipine.   Given brendon resolved can resume lisinopril on discharge

## 2024-07-19 NOTE — ASSESSMENT & PLAN NOTE
Lab Results   Component Value Date    HGBA1C 8.2 (H) 07/17/2024       Recent Labs     07/18/24  1021 07/18/24  1534 07/18/24 2057 07/19/24  0455   POCGLU 209* 190* 205* 123         Blood Sugar Average: Last 72 hrs:  (P) 217.5202990917298285    Repeat hemoglobin A1c 8  Recommend resuming metformin on discharge  Provided 90 day supply of home meds

## 2024-07-19 NOTE — DISCHARGE SUMMARY
[facility]  Discharge- Obed Kelley 1960, 64 y.o. male MRN: [id number]  Unit/Bed#: 7T [facility] 705-01 Encounter: [id number]  Primary Care Provider: Jocelyn Gandhi MD   Date and time admitted to hospital: 7/17/2024  3:39 PM    * LUZ (acute kidney injury) (HCC)  Assessment & Plan  Prerenal LUZ in setting of poor oral intake suspected secondary to a viral gastroenteritis  Resolved with IV fluids    Hypertension  Assessment & Plan  Patient reports he is no longer taking any medications  Hypertensive urgency which improved with symptomatic treatment and amlodipine.   Given luz resolved can resume lisinopril on discharge     Intractable nausea and vomiting  Assessment & Plan  Patient reports he had melon over the weekend and then has not been able to eat and has had minimal fluids since then  Suspect viral gastroenteritis  Resolved with supportive measures   Tolerating full diet without symptoms on day of discharge     High anion gap metabolic acidosis  Assessment & Plan  Suspect this is related to LUZ   Resloved     Diabetes mellitus (HCC)  Assessment & Plan  Lab Results   Component Value Date    HGBA1C 8.2 (H) 07/17/2024       Recent Labs     07/18/24  1021 07/18/24  1534 07/18/24  2057 07/19/24  0455   POCGLU 209* 190* 205* 123         Blood Sugar Average: Last 72 hrs:  (P) 217.9257188543929881    Repeat hemoglobin A1c 8  Recommend resuming metformin on discharge  Provided 90 day supply of home meds       Substance abuse (HCC)  Assessment & Plan  Previous admissions note substance abuse with K2, patient denies drug use other than marijuana when he can afford to purchase    Hyponatremia  Assessment & Plan  Pseudohyponatremia in setting of hyperglycemia  Resolved     Discharging Physician / Practitioner: Ruy Mancera DO  PCP: Jocelyn Gandhi MD  Admission Date:   Admission Orders (From admission, onward)       Ordered        07/17/24 1712  INPATIENT ADMISSION  Once    "                       Discharge Date: 07/19/24    Medical Problems       Resolved Problems  Date Reviewed: 9/26/2023   None         Consultations During Hospital Stay:   IP CONSULT TO CASE MANAGEMENT    Procedures Performed: None    Significant Findings / Test Results:     US right upper quadrant    Result Date: 7/18/2024  Impression: 1.  Trace gallbladder sludge without evidence of acute cholecystitis. No biliary ductal dilation. 2.  Mild hepatomegaly. Resident: EDDI ASHTON I, the attending radiologist, have reviewed the images and agree with the final report above. Workstation performed: [id number]       Incidental Findings: None    Test Results Pending at Discharge (will require follow up): None     Outpatient Tests Requested: None    Reason for Admission: Intractable nausea and vomiting     Hospital Course:     Obed Kelley is a 64 y.o. male with past medical history of substance abuse, hypertension, poorly controlled type 2 diabetes presents to the hospital with several days of intractable nausea vomiting and poor p.o. intake. Symptoms resolved with support measures. Renal function near baseline at time of discharge. Patient was discharged with 90 day supply of home medications.     Please see above list of diagnoses and related plan for additional information.     Condition at Discharge: stable     Discharge Day Visit / Exam:     Subjective:  Patient feels well. He wants to go home.     Vitals: Blood Pressure: 157/95 (07/19/24 0715)  Pulse: 83 (07/19/24 0715)  Temperature: 98 °F (36.7 °C) (07/19/24 0715)  Temp Source: Temporal (07/19/24 0715)  Respirations: 18 (07/19/24 0715)  Height: 5' 11\" (180.3 cm) (07/17/24 1840)  Weight - Scale: 78 kg (172 lb) (07/17/24 1840)  SpO2: 98 % (07/19/24 0715)  Exam:   Physical Exam  Vitals reviewed.   Constitutional:       General: He is not in acute distress.     Appearance: He is well-developed. He is not ill-appearing, toxic-appearing or diaphoretic.   HENT: "      Head: Normocephalic and atraumatic.      Mouth/Throat:      Mouth: Mucous membranes are moist.   Eyes:      General: No scleral icterus.     Extraocular Movements: Extraocular movements intact.   Cardiovascular:      Rate and Rhythm: Normal rate and regular rhythm.      Heart sounds: Normal heart sounds.   Pulmonary:      Effort: Pulmonary effort is normal. No respiratory distress.      Breath sounds: Normal breath sounds. No wheezing or rales.   Abdominal:      General: There is no distension.      Palpations: Abdomen is soft.      Tenderness: There is no abdominal tenderness. There is no guarding or rebound.   Musculoskeletal:         General: No swelling, tenderness or deformity.   Skin:     General: Skin is warm and dry.   Neurological:      General: No focal deficit present.      Mental Status: He is alert. Mental status is at baseline.   Psychiatric:         Mood and Affect: Mood normal.         Behavior: Behavior normal.         Thought Content: Thought content normal.         Judgment: Judgment normal.           Discharge instructions/Information to patient and family:   See after visit summary for information provided to patient and family.      Provisions for Follow-Up Care:  See after visit summary for information related to follow-up care and any pertinent home health orders.      Disposition:     Home     Discharge Statement:  I spent 60 minutes discharging the patient. This time was spent on the day of discharge. I had direct contact with the patient on the day of discharge. Greater than 50% of the total time was spent examining patient, answering all patient questions, arranging and discussing plan of care with patient as well as directly providing post-discharge instructions.  Additional time then spent on discharge activities.    Discharge Medications:  See after visit summary for reconciled discharge medications provided to patient and family.      ** Please Note: This note has been  constructed using a voice recognition system **

## 2024-07-19 NOTE — NURSING NOTE
Discharge instructions given to pt. Pt verbalizes the understanding of discharge instructions. Medications delivered to bedside. All belongings given to patient. Patient in no distress and no concerns at this time.  Lyft ride provided for patient

## 2024-07-19 NOTE — ASSESSMENT & PLAN NOTE
Prerenal LUZ in setting of poor oral intake suspected secondary to a viral gastroenteritis  Resolved with IV fluids

## 2024-07-19 NOTE — PLAN OF CARE
Problem: GASTROINTESTINAL - ADULT  Goal: Maintains adequate nutritional intake  Description: INTERVENTIONS:  - Monitor percentage of each meal consumed  - Identify factors contributing to decreased intake, treat as appropriate  - Assist with meals as needed  - Monitor I&O, weight, and lab values if indicated  - Obtain nutrition services referral as needed  Outcome: Progressing     Problem: METABOLIC, FLUID AND ELECTROLYTES - ADULT  Goal: Electrolytes maintained within normal limits  Description: INTERVENTIONS:  - Monitor labs and assess patient for signs and symptoms of electrolyte imbalances  - Administer electrolyte replacement as ordered  - Monitor response to electrolyte replacements, including repeat lab results as appropriate  - Instruct patient on fluid and nutrition as appropriate  Outcome: Progressing     Problem: METABOLIC, FLUID AND ELECTROLYTES - ADULT  Goal: Fluid balance maintained  Description: INTERVENTIONS:  - Monitor labs   - Monitor I/O and WT  - Instruct patient on fluid and nutrition as appropriate  - Assess for signs & symptoms of volume excess or deficit  Outcome: Progressing     Problem: METABOLIC, FLUID AND ELECTROLYTES - ADULT  Goal: Glucose maintained within target range  Description: INTERVENTIONS:  - Monitor Blood Glucose as ordered  - Assess for signs and symptoms of hyperglycemia and hypoglycemia  - Administer ordered medications to maintain glucose within target range  - Assess nutritional intake and initiate nutrition service referral as needed  Outcome: Progressing     Problem: Knowledge Deficit  Goal: Patient/family/caregiver demonstrates understanding of disease process, treatment plan, medications, and discharge instructions  Description: Complete learning assessment and assess knowledge base.  Interventions:  - Provide teaching at level of understanding  - Provide teaching via preferred learning methods  Outcome: Progressing     Problem: DISCHARGE PLANNING  Goal: Discharge to  home or other facility with appropriate resources  Description: INTERVENTIONS:  - Identify barriers to discharge w/patient and caregiver  - Arrange for needed discharge resources and transportation as appropriate  - Identify discharge learning needs (meds, wound care, etc.)  - Arrange for interpretive services to assist at discharge as needed  - Refer to Case Management Department for coordinating discharge planning if the patient needs post-hospital services based on physician/advanced practitioner order or complex needs related to functional status, cognitive ability, or social support system  Outcome: Progressing

## 2024-07-19 NOTE — CASE MANAGEMENT
Case Management Progress Note    Patient name Obed Kelley  Location 7T /7T -01 MRN [id number]  : 1960 Date 2024       LOS (days): 2  Geometric Mean LOS (GMLOS) (days): 3.1  Days to GMLOS:1.4        OBJECTIVE:        Current admission status: Inpatient  Preferred Pharmacy:    [facility] [address] [address]  Phone: [phone] Fax: [phone]    Primary Care Provider: Jocelyn Gandhi MD    Primary Insurance: [employer]  Secondary Insurance:     PROGRESS NOTE:    Meds to beds given to patient by nurse.  Patient medically cleared to discharge.  LYFT waiver signed and provided to media.  KATHY scheduled via roundtrip.

## 2024-07-19 NOTE — ASSESSMENT & PLAN NOTE
Patient reports he had melon over the weekend and then has not been able to eat and has had minimal fluids since then  Suspect viral gastroenteritis  Resolved with supportive measures   Tolerating full diet without symptoms on day of discharge

## 2024-07-22 ENCOUNTER — TRANSITIONAL CARE MANAGEMENT (OUTPATIENT)
Dept: FAMILY MEDICINE CLINIC | Facility: CLINIC | Age: 64
End: 2024-07-22

## 2024-07-22 DIAGNOSIS — Z71.89 COORDINATION OF COMPLEX CARE: Primary | ICD-10-CM

## 2024-07-23 ENCOUNTER — HOSPITAL ENCOUNTER (EMERGENCY)
Facility: HOSPITAL | Age: 64
Discharge: HOME | End: 2024-07-23
Attending: INTERNAL MEDICINE
Payer: COMMERCIAL

## 2024-07-23 ENCOUNTER — PATIENT OUTREACH (OUTPATIENT)
Dept: FAMILY MEDICINE CLINIC | Facility: CLINIC | Age: 64
End: 2024-07-23

## 2024-07-23 VITALS
SYSTOLIC BLOOD PRESSURE: 157 MMHG | BODY MASS INDEX: 21.79 KG/M2 | WEIGHT: 156.2 LBS | OXYGEN SATURATION: 100 % | HEART RATE: 81 BPM | RESPIRATION RATE: 16 BRPM | TEMPERATURE: 98.6 F | DIASTOLIC BLOOD PRESSURE: 88 MMHG

## 2024-07-23 DIAGNOSIS — Z71.89 ENCOUNTER FOR MEDICATION COUNSELING: Primary | ICD-10-CM

## 2024-07-23 DIAGNOSIS — G47.00 INSOMNIA: ICD-10-CM

## 2024-07-23 PROCEDURE — 99283 EMERGENCY DEPT VISIT LOW MDM: CPT

## 2024-07-23 PROCEDURE — 99284 EMERGENCY DEPT VISIT MOD MDM: CPT

## 2024-07-23 RX ORDER — UREA 10 %
1 LOTION (ML) TOPICAL
Qty: 14 TABLET | Refills: 0 | Status: SHIPPED | OUTPATIENT
Start: 2024-07-23

## 2024-07-23 NOTE — PROGRESS NOTES
IB message received re HRR.    I called the patient using [employer] but there was no answer and no option to leave a message.  I will continue further outreach.    Chart reviewed.

## 2024-07-24 ENCOUNTER — OFFICE VISIT (OUTPATIENT)
Dept: FAMILY MEDICINE CLINIC | Facility: CLINIC | Age: 64
End: 2024-07-24

## 2024-07-24 VITALS
HEART RATE: 74 BPM | TEMPERATURE: 97.8 F | HEIGHT: 71 IN | WEIGHT: 156 LBS | DIASTOLIC BLOOD PRESSURE: 81 MMHG | SYSTOLIC BLOOD PRESSURE: 137 MMHG | BODY MASS INDEX: 21.84 KG/M2 | RESPIRATION RATE: 18 BRPM | OXYGEN SATURATION: 98 %

## 2024-07-24 DIAGNOSIS — I10 PRIMARY HYPERTENSION: ICD-10-CM

## 2024-07-24 DIAGNOSIS — G47.9 SLEEP DISORDER: Primary | ICD-10-CM

## 2024-07-24 DIAGNOSIS — E11.65 TYPE 2 DIABETES MELLITUS WITH HYPERGLYCEMIA, WITHOUT LONG-TERM CURRENT USE OF INSULIN (HCC): ICD-10-CM

## 2024-07-24 PROCEDURE — 99213 OFFICE O/P EST LOW 20 MIN: CPT | Performed by: FAMILY MEDICINE

## 2024-07-24 RX ORDER — TRAZODONE HYDROCHLORIDE 50 MG/1
50 TABLET ORAL
Qty: 90 TABLET | Refills: 3 | Status: SHIPPED | OUTPATIENT
Start: 2024-07-24

## 2024-07-24 RX ORDER — METFORMIN HYDROCHLORIDE 750 MG/1
750 TABLET, EXTENDED RELEASE ORAL
Qty: 100 TABLET | Refills: 3 | Status: SHIPPED | OUTPATIENT
Start: 2024-07-24

## 2024-07-25 ENCOUNTER — PATIENT OUTREACH (OUTPATIENT)
Dept: FAMILY MEDICINE CLINIC | Facility: CLINIC | Age: 64
End: 2024-07-25

## 2024-07-25 PROBLEM — G47.9 SLEEP DISORDER: Status: ACTIVE | Noted: 2024-07-25

## 2024-07-25 NOTE — ASSESSMENT & PLAN NOTE
Will do a trial of trazodone.  Discussed with patient appropriate sleep hygiene such as avoiding being on his phone and having television off.  Instructed patient to take trazodone with melatonin if trazodone does not work.

## 2024-07-25 NOTE — ASSESSMENT & PLAN NOTE
BP Readings from Last 3 Encounters:   07/24/24 137/81   07/23/24 157/88   07/19/24 157/95       Blood pressure within normal limits.  Supposedly on lisinopril 5.  Will discontinue medication for now and continue to monitor.   Lab Results   Component Value Date    HGBA1C 6 6 (A) 08/07/2019     Good diabetic control on metformin 1000 twice daily  Patient follows a very prudent diet avoiding sugary snacks, beverages and excessive carbohydrates  No results for input(s): POCGLU in the last 72 hours      Blood Sugar Average: Last 72 hrs:

## 2024-07-25 NOTE — PROGRESS NOTES
I called the patient using [employer] but there was no answer and no option to leave a message.  I am sending the 'unable to reach' letter and await response.

## 2024-07-25 NOTE — UTILIZATION REVIEW
NOTIFICATION OF ADMISSION DISCHARGE   This is a Notification of Discharge from [facility]. Please be advised that this patient has been discharge from our facility. Below you will find the admission and discharge date and time including the patient’s disposition.   UTILIZATION REVIEW CONTACT:  Ada Granados MA  Utilization   Network Utilization Review Department  Phone: [phone] x carefully listen to the prompts. All voicemails are confidential.  Email: [email or web address]     ADMISSION INFORMATION  PRESENTATION DATE: 7/17/2024  3:39 PM  OBERVATION ADMISSION DATE: N/A  INPATIENT ADMISSION DATE: 7/17/24  5:12 PM   DISCHARGE DATE: 7/19/2024 11:26 AM   DISPOSITION:Home/Self Care    Network Utilization Review Department  ATTENTION: Please call with any questions or concerns to [phone] and carefully listen to the prompts so that you are directed to the right person. All voicemails are confidential.   For Discharge needs, contact Care Management DC Support Team at [phone] opt. 2  Send all requests for admission clinical reviews, approved or denied determinations and any other requests to dedicated fax number below belonging to the campus where the patient is receiving treatment. List of dedicated fax numbers for the Facilities:  FACILITY NAME UR FAX NUMBER   ADMISSION DENIALS (Administrative/Medical Necessity) [phone]   DISCHARGE SUPPORT TEAM ([facility]) [phone]   PARENT CHILD HEALTH (Maternity/NICU/Pediatrics) [phone]   [facility] [phone]   General acute hospital [phone]   [facility] [phone]   [facility] [phone]   [facility] [phone]   [facility] [phone]   [facility] [phone]   [facility]  [phone]   [facility] [phone]   [facility] [phone]   [facility] [phone]   [facility] [place] [facility] [phone]

## 2024-07-25 NOTE — LETTER
Fecha: 07/25/24    Estimado/a Obed Bustillogo:  Mi nombre es Stefanie. 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 No pude comunicarme con usted y me gustaría programar un horario para que hablemos por teléfono o personalmente.  Me dedico a ayudar a los pacientes que tienen afecciones médicas complejas a obtener la atención que necesitan. [place] comprende a pacientes que pueden estar en el hospital o en lili deana de emergencias.  Si tiene preguntas, no dude en llamarme. Espero rodas llamado.    Atentamente.  Stefanie  [phone]  Gerente de atención ambulatoria

## 2024-07-25 NOTE — PROGRESS NOTES
Assessment/Plan:    1. Sleep disorder  Assessment & Plan:  Will do a trial of trazodone.  Discussed with patient appropriate sleep hygiene such as avoiding being on his phone and having television off.  Instructed patient to take trazodone with melatonin if trazodone does not work.  Orders:  -     traZODone (DESYREL) 50 mg tablet; Take 1 tablet (50 mg total) by mouth daily at bedtime  2. Type 2 diabetes mellitus with hyperglycemia, without long-term current use of insulin ([facility])  Assessment & Plan:  Lab Results   Component Value Date    HGBA1C 8.2 (H) 07/17/2024     Elevated A1c.  Will increase dose of metformin to 750 XR.  Will have patient follow-up in 3 months.  Discussed lifestyle modifications with patient.  Will consider adding additional agent if A1c continues to be elevated.  Orders:  -     metFORMIN (GLUCOPHAGE-XR) 750 mg 24 hr tablet; Take 1 tablet (750 mg total) by mouth daily with breakfast  3. Primary hypertension  Assessment & Plan:  BP Readings from Last 3 Encounters:   07/24/24 137/81   07/23/24 157/88   07/19/24 157/95       Blood pressure within normal limits.  Supposedly on lisinopril 5.  Will discontinue medication for now and continue to monitor.       Subjective:      Patient ID: Obed Kelley is a 64 y.o. male.    Past medical history notable for type 2 diabetes is coming in with chief complaint of inability to sleep for the last 3 days.  Patient recently went to the ED in which they prescribed melatonin.  Patient tried the medication and found no relieving factors.  Patient reports to have good sleep hygiene.  He attributes the lack of sleep due to the multiple medications that he is on.  Patient denies any other neurological deficits.        The following portions of the patient's history were reviewed and updated as appropriate: allergies, current medications, past family history, past medical history, past social history, past surgical history, and problem list.    Review of  "Systems   Constitutional:  Negative for chills and fever.   HENT:  Negative for ear pain and sore throat.    Eyes:  Negative for pain and visual disturbance.   Respiratory:  Negative for cough and shortness of breath.    Cardiovascular:  Negative for chest pain and palpitations.   Gastrointestinal:  Negative for abdominal pain and vomiting.   Genitourinary:  Negative for dysuria and hematuria.   Musculoskeletal:  Negative for arthralgias and back pain.   Skin:  Negative for color change and rash.   Neurological:  Negative for seizures and syncope.   Psychiatric/Behavioral:  Positive for sleep disturbance.    All other systems reviewed and are negative.        Objective:      /81 (BP Location: Left arm, Patient Position: Sitting, Cuff Size: Standard)   Pulse 74   Temp 97.8 °F (36.6 °C) (Temporal)   Resp 18   Ht 5' 11\" (1.803 m)   Wt 70.8 kg (156 lb)   SpO2 98%   BMI 21.76 kg/m²          Physical Exam  Constitutional:       General: He is not in acute distress.     Appearance: Normal appearance. He is not toxic-appearing or diaphoretic.   HENT:      Head: Normocephalic.      Mouth/Throat:      Mouth: Mucous membranes are moist.   Eyes:      Extraocular Movements: Extraocular movements intact.      Pupils: Pupils are equal, round, and reactive to light.   Cardiovascular:      Rate and Rhythm: Normal rate and regular rhythm.   Pulmonary:      Effort: Pulmonary effort is normal. No tachypnea.      Breath sounds: Normal breath sounds. No wheezing or rales.   Chest:      Chest wall: No edema. There is no dullness to percussion.   Abdominal:      General: Abdomen is flat. There is no distension.      Palpations: Abdomen is soft.      Tenderness: There is no abdominal tenderness.   Musculoskeletal:      Right lower leg: No edema.      Left lower leg: No edema.   Skin:     Capillary Refill: Capillary refill takes less than 2 seconds.   Neurological:      General: No focal deficit present.      Mental Status: He " is alert and oriented to person, place, and time.   Psychiatric:         Mood and Affect: Mood normal.         Behavior: Behavior normal.           Branden Bullock DO   Family Medicine PGY-3  7/25/2024

## 2024-07-25 NOTE — ASSESSMENT & PLAN NOTE
Lab Results   Component Value Date    HGBA1C 8.2 (H) 07/17/2024     Elevated A1c.  Will increase dose of metformin to 750 XR.  Will have patient follow-up in 3 months.  Discussed lifestyle modifications with patient.  Will consider adding additional agent if A1c continues to be elevated.

## 2024-08-12 ENCOUNTER — PATIENT OUTREACH (OUTPATIENT)
Dept: FAMILY MEDICINE CLINIC | Facility: CLINIC | Age: 64
End: 2024-08-12

## 2024-11-01 ENCOUNTER — OFFICE VISIT (OUTPATIENT)
Dept: FAMILY MEDICINE CLINIC | Facility: CLINIC | Age: 64
End: 2024-11-01

## 2024-11-01 VITALS
HEART RATE: 94 BPM | HEIGHT: 71 IN | DIASTOLIC BLOOD PRESSURE: 70 MMHG | SYSTOLIC BLOOD PRESSURE: 140 MMHG | BODY MASS INDEX: 23.66 KG/M2 | WEIGHT: 169 LBS | TEMPERATURE: 98 F | RESPIRATION RATE: 16 BRPM | OXYGEN SATURATION: 99 %

## 2024-11-01 DIAGNOSIS — E11.29 MICROALBUMINURIA DUE TO TYPE 2 DIABETES MELLITUS  (HCC): ICD-10-CM

## 2024-11-01 DIAGNOSIS — R80.9 MICROALBUMINURIA DUE TO TYPE 2 DIABETES MELLITUS  (HCC): ICD-10-CM

## 2024-11-01 DIAGNOSIS — I10 PRIMARY HYPERTENSION: ICD-10-CM

## 2024-11-01 DIAGNOSIS — E11.65 TYPE 2 DIABETES MELLITUS WITH HYPERGLYCEMIA, WITHOUT LONG-TERM CURRENT USE OF INSULIN (HCC): Primary | ICD-10-CM

## 2024-11-01 PROBLEM — R11.2 INTRACTABLE NAUSEA AND VOMITING: Status: RESOLVED | Noted: 2024-07-17 | Resolved: 2024-11-01

## 2024-11-01 PROBLEM — N17.9 AKI (ACUTE KIDNEY INJURY) (HCC): Status: RESOLVED | Noted: 2023-08-02 | Resolved: 2024-11-01

## 2024-11-01 PROBLEM — E87.1 HYPONATREMIA: Status: RESOLVED | Noted: 2023-08-02 | Resolved: 2024-11-01

## 2024-11-01 PROBLEM — E87.29 HIGH ANION GAP METABOLIC ACIDOSIS: Status: RESOLVED | Noted: 2024-07-17 | Resolved: 2024-11-01

## 2024-11-01 PROBLEM — G47.9 SLEEP DISORDER: Status: RESOLVED | Noted: 2024-07-25 | Resolved: 2024-11-01

## 2024-11-01 PROBLEM — R26.2 AMBULATORY DYSFUNCTION: Status: RESOLVED | Noted: 2023-08-29 | Resolved: 2024-11-01

## 2024-11-01 LAB
CREAT UR-MCNC: 213.3 MG/DL
MICROALBUMIN UR-MCNC: 143.1 MG/L
MICROALBUMIN/CREAT 24H UR: 67 MG/G CREATININE (ref 0–30)
SL AMB POCT HEMOGLOBIN AIC: 7.5 (ref ?–6.5)

## 2024-11-01 PROCEDURE — 82570 ASSAY OF URINE CREATININE: CPT

## 2024-11-01 PROCEDURE — 99213 OFFICE O/P EST LOW 20 MIN: CPT

## 2024-11-01 PROCEDURE — 83036 HEMOGLOBIN GLYCOSYLATED A1C: CPT

## 2024-11-01 PROCEDURE — 82043 UR ALBUMIN QUANTITATIVE: CPT

## 2024-11-01 RX ORDER — OMEGA-3-ACID ETHYL ESTERS 1 G/1
2 CAPSULE, LIQUID FILLED ORAL 2 TIMES DAILY
Qty: 180 CAPSULE | Refills: 5 | Status: SHIPPED | OUTPATIENT
Start: 2024-11-01

## 2024-11-01 RX ORDER — METFORMIN HYDROCHLORIDE 750 MG/1
750 TABLET, EXTENDED RELEASE ORAL
Qty: 100 TABLET | Refills: 3 | Status: SHIPPED | OUTPATIENT
Start: 2024-11-01

## 2024-11-01 RX ORDER — ATORVASTATIN CALCIUM 20 MG/1
20 TABLET, FILM COATED ORAL DAILY
Qty: 90 TABLET | Refills: 0 | Status: SHIPPED | OUTPATIENT
Start: 2024-11-01

## 2024-11-01 RX ORDER — LISINOPRIL 5 MG/1
5 TABLET ORAL DAILY
Qty: 90 TABLET | Refills: 0 | Status: SHIPPED | OUTPATIENT
Start: 2024-11-01

## 2024-11-01 RX ORDER — AMMONIUM LACTATE 12 G/100G
LOTION TOPICAL 2 TIMES DAILY PRN
Qty: 225 G | Refills: 2 | Status: SHIPPED | OUTPATIENT
Start: 2024-11-01

## 2024-11-01 RX ORDER — METFORMIN HYDROCHLORIDE 750 MG/1
750 TABLET, EXTENDED RELEASE ORAL
Qty: 100 TABLET | Refills: 3 | Status: SHIPPED | OUTPATIENT
Start: 2024-11-01 | End: 2024-11-01 | Stop reason: SDDI

## 2024-11-01 NOTE — ASSESSMENT & PLAN NOTE
Lab Results   Component Value Date    HGBA1C 7.5 (A) 11/01/2024       Orders:    lisinopril (ZESTRIL) 5 mg tablet; Take 1 tablet (5 mg total) by mouth daily

## 2024-11-01 NOTE — ASSESSMENT & PLAN NOTE
Controlled  Denies polyruia, polydipsia, polyphagia  Lab Results   Component Value Date    HGBA1C 7.5 (A) 11/01/2024   PLAN:  Diabetic diet   Cardiovascular exercise 150 min weekly  Continue ACEI  Continue statin      Orders:    Diabetic foot exam; Future    IRIS Diabetic eye exam    POCT hemoglobin A1c    lisinopril (ZESTRIL) 5 mg tablet; Take 1 tablet (5 mg total) by mouth daily    atorvastatin (LIPITOR) 20 mg tablet; Take 1 tablet (20 mg total) by mouth daily    metFORMIN (GLUCOPHAGE-XR) 750 mg 24 hr tablet; Take 1 tablet (750 mg total) by mouth daily with breakfast    omega-3-acid ethyl esters (LOVAZA) 1 g capsule; Take 2 capsules (2 g total) by mouth 2 (two) times a day    Ambulatory Referral to Podiatry; Future    ammonium lactate (LAC-HYDRIN) 12 % lotion; Apply topically 2 (two) times a day as needed for dry skin    Albumin / creatinine urine ratio

## 2024-11-01 NOTE — ASSESSMENT & PLAN NOTE
Controlled  At home <140/90  No chest pain, headaches or visual disturbance  PLAN:  Continue lisinopril daily

## 2024-11-01 NOTE — PROGRESS NOTES
Ambulatory Visit  Name: Obed Kelley      : 1960      MRN: [id number]  Encounter Provider: Jocelyn Gandhi MD  Encounter Date: 2024   Encounter department: [facility]    Assessment & Plan  Type 2 diabetes mellitus with hyperglycemia, without long-term current use of insulin (HCC)  Controlled  Denies polyruia, polydipsia, polyphagia  Lab Results   Component Value Date    HGBA1C 7.5 (A) 2024   PLAN:  Diabetic diet   Cardiovascular exercise 150 min weekly  Continue ACEI  Continue statin      Orders:    Diabetic foot exam; Future    IRIS Diabetic eye exam    POCT hemoglobin A1c    lisinopril (ZESTRIL) 5 mg tablet; Take 1 tablet (5 mg total) by mouth daily    atorvastatin (LIPITOR) 20 mg tablet; Take 1 tablet (20 mg total) by mouth daily    metFORMIN (GLUCOPHAGE-XR) 750 mg 24 hr tablet; Take 1 tablet (750 mg total) by mouth daily with breakfast    omega-3-acid ethyl esters (LOVAZA) 1 g capsule; Take 2 capsules (2 g total) by mouth 2 (two) times a day    Ambulatory Referral to Podiatry; Future    ammonium lactate (LAC-HYDRIN) 12 % lotion; Apply topically 2 (two) times a day as needed for dry skin    Albumin / creatinine urine ratio    Microalbuminuria due to type 2 diabetes mellitus  (HCC)    Lab Results   Component Value Date    HGBA1C 7.5 (A) 2024       Orders:    lisinopril (ZESTRIL) 5 mg tablet; Take 1 tablet (5 mg total) by mouth daily    Primary hypertension  Controlled  At home <140/90  No chest pain, headaches or visual disturbance  PLAN:  Continue lisinopril daily          History of Present Illness     Patient presented to the office today to follow up on chronic conditions diabetes and hypertension. He is doing well, chronic conditions are well controlled  We will follow up in 3 months.          Review of Systems   Constitutional:  Negative for chills and fever.   HENT:  Negative for ear pain and sore throat.    Eyes:  Negative for pain  "and visual disturbance.   Respiratory:  Negative for cough and shortness of breath.    Cardiovascular:  Negative for chest pain and palpitations.   Gastrointestinal:  Negative for abdominal pain and vomiting.   Genitourinary:  Negative for dysuria and hematuria.   Musculoskeletal:  Negative for arthralgias and back pain.   Skin:  Negative for color change and rash.   Neurological:  Negative for seizures and syncope.   All other systems reviewed and are negative.          Objective     /70 (BP Location: Right arm, Patient Position: Sitting, Cuff Size: Standard)   Pulse 94   Temp 98 °F (36.7 °C) (Temporal)   Resp 16   Ht 5' 11\" (1.803 m)   Wt 76.7 kg (169 lb)   SpO2 99%   BMI 23.57 kg/m²     Physical Exam  Vitals and nursing note reviewed.   Constitutional:       General: He is not in acute distress.     Appearance: He is well-developed.   HENT:      Head: Normocephalic and atraumatic.   Eyes:      Conjunctiva/sclera: Conjunctivae normal.   Cardiovascular:      Rate and Rhythm: Normal rate and regular rhythm.      Pulses: no weak pulses.           Dorsalis pedis pulses are 2+ on the right side and 2+ on the left side.        Posterior tibial pulses are 2+ on the right side and 2+ on the left side.      Heart sounds: No murmur heard.  Pulmonary:      Effort: Pulmonary effort is normal. No respiratory distress.      Breath sounds: Normal breath sounds.   Abdominal:      Palpations: Abdomen is soft.      Tenderness: There is no abdominal tenderness.   Musculoskeletal:         General: No swelling.      Cervical back: Neck supple.   Feet:      Right foot:      Skin integrity: Dry skin present. No ulcer, skin breakdown, erythema, warmth or callus.      Left foot:      Skin integrity: Dry skin present. No ulcer, skin breakdown, erythema, warmth or callus.   Skin:     General: Skin is warm and dry.      Capillary Refill: Capillary refill takes less than 2 seconds.   Neurological:      General: No focal deficit " present.      Mental Status: He is alert and oriented to person, place, and time.   Psychiatric:         Mood and Affect: Mood normal.       Patient's shoes and socks removed.    Right Foot/Ankle   Right Foot Inspection  Skin Exam: skin normal, skin intact and dry skin. No warmth, no callus, no erythema, no maceration, no abnormal color, no pre-ulcer, no ulcer and no callus.     Toe Exam: ROM and strength within normal limits.     Sensory   Vibration: diminished  Proprioception: intact  Monofilament testing: intact    Vascular  Capillary refills: < 3 seconds  The right DP pulse is 2+. The right PT pulse is 2+.     Left Foot/Ankle  Left Foot Inspection  Skin Exam: skin normal, skin intact and dry skin. No warmth, no erythema, no maceration, normal color, no pre-ulcer, no ulcer and no callus.     Toe Exam: ROM and strength within normal limits.     Sensory   Vibration: diminished  Proprioception: intact  Monofilament testing: intact    Vascular  Capillary refills: < 3 seconds  The left DP pulse is 2+. The left PT pulse is 2+.     Assign Risk Category  No deformity present  No loss of protective sensation  No weak pulses  Risk: 1

## 2025-01-29 NOTE — PLAN OF CARE
Attempted to call the patient. Voicemail left to call the office and to make the appointment   Problem: PAIN - ADULT  Goal: Verbalizes/displays adequate comfort level or baseline comfort level  Description: Interventions:  - Encourage patient to monitor pain and request assistance  - Assess pain using appropriate pain scale  - Administer analgesics based on type and severity of pain and evaluate response  - Implement non-pharmacological measures as appropriate and evaluate response  - Consider cultural and social influences on pain and pain management  - Notify physician/advanced practitioner if interventions unsuccessful or patient reports new pain  Outcome: Progressing     Problem: INFECTION - ADULT  Goal: Absence or prevention of progression during hospitalization  Description: INTERVENTIONS:  - Assess and monitor for signs and symptoms of infection  - Monitor lab/diagnostic results  - Monitor all insertion sites, i.e. indwelling lines, tubes, and drains  - Monitor endotracheal if appropriate and nasal secretions for changes in amount and color  - [place] appropriate cooling/warming therapies per order  - Administer medications as ordered  - Instruct and encourage patient and family to use good hand hygiene technique  - Identify and instruct in appropriate isolation precautions for identified infection/condition  Outcome: Progressing     Problem: DISCHARGE PLANNING  Goal: Discharge to home or other facility with appropriate resources  Description: INTERVENTIONS:  - Identify barriers to discharge w/patient and caregiver  - Arrange for needed discharge resources and transportation as appropriate  - Identify discharge learning needs (meds, wound care, etc.)  - Arrange for interpretive services to assist at discharge as needed  - Refer to Case Management Department for coordinating discharge planning if the patient needs post-hospital services based on physician/advanced practitioner order or complex needs related to functional status, cognitive ability, or social support system  Outcome: Progressing

## 2025-05-14 ENCOUNTER — TELEPHONE (OUTPATIENT)
Dept: FAMILY MEDICINE CLINIC | Facility: CLINIC | Age: 65
End: 2025-05-14

## 2025-05-14 NOTE — TELEPHONE ENCOUNTER
first attempt to contact patient to schedule f/u dm, A1c f/u. Left message to return my call on answering machine.

## 2025-05-14 NOTE — TELEPHONE ENCOUNTER
Patient request transportation for his james 05/22/25 @ 4pm    Address and phone number was confirmed.

## 2025-06-26 ENCOUNTER — HOSPITAL ENCOUNTER (EMERGENCY)
Facility: HOSPITAL | Age: 65
Discharge: HOME | End: 2025-06-26
Attending: EMERGENCY MEDICINE
Payer: COMMERCIAL

## 2025-06-26 VITALS
TEMPERATURE: 98.8 F | HEART RATE: 101 BPM | DIASTOLIC BLOOD PRESSURE: 101 MMHG | WEIGHT: 157.7 LBS | RESPIRATION RATE: 18 BRPM | SYSTOLIC BLOOD PRESSURE: 181 MMHG | BODY MASS INDEX: 21.99 KG/M2 | OXYGEN SATURATION: 95 %

## 2025-06-26 DIAGNOSIS — R11.10 VOMITING: Primary | ICD-10-CM

## 2025-06-26 DIAGNOSIS — R06.6 HICCUP: ICD-10-CM

## 2025-06-26 PROCEDURE — 96372 THER/PROPH/DIAG INJ SC/IM: CPT

## 2025-06-26 PROCEDURE — 99284 EMERGENCY DEPT VISIT MOD MDM: CPT | Performed by: EMERGENCY MEDICINE

## 2025-06-26 PROCEDURE — 99282 EMERGENCY DEPT VISIT SF MDM: CPT

## 2025-06-26 RX ORDER — CHLORPROMAZINE HYDROCHLORIDE 25 MG/ML
25 INJECTION INTRAMUSCULAR ONCE
Status: COMPLETED | OUTPATIENT
Start: 2025-06-26 | End: 2025-06-26

## 2025-06-26 RX ADMIN — CHLORPROMAZINE HYDROCHLORIDE 25 MG: 25 INJECTION INTRAMUSCULAR at 18:08

## 2025-06-26 NOTE — ED PROVIDER NOTES
Time reflects when diagnosis was documented in both MDM as applicable and the Disposition within this note       Time User Action Codes Description Comment    6/26/2025  7:06 PM Leonidas Malagon Add [R11.10] Vomiting     6/26/2025  7:06 PM Leonidas Malagon Add [R06.6] Hiccup           ED Disposition       ED Disposition   Discharge    Condition   Stable    Date/Time   Thu Jun 26, 2025  7:06 PM    Comment   Obed Kelley discharge to home/self care.                   Assessment & Plan       Medical Decision Making  65-year-old male presents with hiccups and nausea vomiting  Patient reports no chest pain, shortness of breath  Patient reports no other complaints  Not highly concerning for dissection, ACS  Will give symptomatic treatment  After the treatment patient endorses complete resolution of symptoms  Patient was discharged with return precautions provided      Problems Addressed:  Hiccup: acute illness or injury  Vomiting: acute illness or injury    Risk  Prescription drug management.             Medications   chlorproMAZINE (THORAZINE) injection SOLN 25 mg (25 mg Intramuscular Given 6/26/25 1808)       ED Risk Strat Scores                    No data recorded        SBIRT 20yo+      Flowsheet Row Most Recent Value   Initial Alcohol Screen: US AUDIT-C     1. How often do you have a drink containing alcohol? 0 Filed at: 06/26/2025 1849   2. How many drinks containing alcohol do you have on a typical day you are drinking?  0 Filed at: 06/26/2025 1849   3b. FEMALE Any Age, or MALE 65+: How often do you have 4 or more drinks on one occassion? 0 Filed at: 06/26/2025 1849   Audit-C Score 0 Filed at: 06/26/2025 1849   MARIA C: How many times in the past year have you...    Used an illegal drug or used a prescription medication for non-medical reasons? Never Filed at: 06/26/2025 1849                            History of Present Illness       Chief Complaint   Patient presents with    Vomiting     Patient ate a meal 4 days ago  and it got him sick. He has been vomiting since and has hiccups. No diarrhea.        Past Medical History[1]   Past Surgical History[2]   Family History[3]   Social History[4]   E-Cigarette/Vaping    E-Cigarette Use Never User       E-Cigarette/Vaping Substances    Nicotine No     THC No     CBD No     Flavoring No     Other No     Unknown No       I have reviewed and agree with the history as documented.     HPI  65-year-old male presents with nausea vomiting and persistent hiccups.  Patient states that the symptoms started since 4 days ago.  States that whenever he tries to eat something he feels nauseous.  Denies any lightheadedness, chest pain, shortness of breath.  Also reports no radiating back pain.  Has not taken any medications.  Reports no shortness of breath.        Review of Systems   Constitutional:  Negative for chills, diaphoresis, fever and unexpected weight change.   HENT:  Negative for ear pain and sore throat.    Eyes:  Negative for visual disturbance.   Respiratory:  Negative for cough, chest tightness and shortness of breath.    Cardiovascular:  Negative for chest pain and leg swelling.   Gastrointestinal:  Positive for nausea and vomiting. Negative for abdominal distention, abdominal pain, constipation and diarrhea.   Endocrine: Negative.    Genitourinary:  Negative for difficulty urinating and dysuria.   Musculoskeletal: Negative.    Skin: Negative.    Allergic/Immunologic: Negative.    Neurological: Negative.    Hematological: Negative.    Psychiatric/Behavioral: Negative.     All other systems reviewed and are negative.          Objective       ED Triage Vitals   Temperature Pulse Blood Pressure Respirations SpO2 Patient Position - Orthostatic VS   06/26/25 1752 06/26/25 1750 06/26/25 1750 06/26/25 1750 06/26/25 1750 06/26/25 1750   98.8 °F (37.1 °C) 101 (!) 181/101 18 95 % Lying      Temp Source Heart Rate Source BP Location FiO2 (%) Pain Score    06/26/25 1752 06/26/25 1750 06/26/25 1750  -- 06/26/25 1750    Oral Monitor Left arm  No Pain      Vitals      Date and Time Temp Pulse SpO2 Resp BP Pain Score FACES Pain Rating User   06/26/25 1752 98.8 °F (37.1 °C) -- -- -- -- -- -- CFW   06/26/25 1750 -- 101 95 % 18 181/101 No Pain -- CFW            Physical Exam  Vitals and nursing note reviewed.   Constitutional:       General: He is not in acute distress.     Appearance: Normal appearance. He is not ill-appearing.   HENT:      Head: Normocephalic and atraumatic.      Right Ear: External ear normal.      Left Ear: External ear normal.      Nose: Nose normal.      Mouth/Throat:      Mouth: Mucous membranes are moist.      Pharynx: Oropharynx is clear.     Eyes:      General: No scleral icterus.        Right eye: No discharge.         Left eye: No discharge.      Extraocular Movements: Extraocular movements intact.      Conjunctiva/sclera: Conjunctivae normal.      Pupils: Pupils are equal, round, and reactive to light.       Cardiovascular:      Rate and Rhythm: Normal rate and regular rhythm.      Pulses: Normal pulses.      Heart sounds: Normal heart sounds.   Pulmonary:      Effort: Pulmonary effort is normal.      Breath sounds: Normal breath sounds.   Abdominal:      General: Abdomen is flat. Bowel sounds are normal. There is no distension.      Palpations: Abdomen is soft.      Tenderness: There is no abdominal tenderness. There is no guarding or rebound.     Musculoskeletal:         General: Normal range of motion.      Cervical back: Normal range of motion and neck supple.     Skin:     General: Skin is warm and dry.      Capillary Refill: Capillary refill takes less than 2 seconds.     Neurological:      General: No focal deficit present.      Mental Status: He is alert and oriented to person, place, and time. Mental status is at baseline.     Psychiatric:         Mood and Affect: Mood normal.         Behavior: Behavior normal.         Thought Content: Thought content normal.         Judgment:  Judgment normal.         Results Reviewed       None            No orders to display       Procedures    ED Medication and Procedure Management   Prior to Admission Medications   Prescriptions Last Dose Informant Patient Reported? Taking?   ammonium lactate (LAC-HYDRIN) 12 % lotion   No No   Sig: Apply topically 2 (two) times a day as needed for dry skin   atorvastatin (LIPITOR) 20 mg tablet   No No   Sig: Take 1 tablet (20 mg total) by mouth daily   lisinopril (ZESTRIL) 5 mg tablet   No No   Sig: Take 1 tablet (5 mg total) by mouth daily   melatonin 1 mg   No No   Sig: Take 1 tablet (1 mg total) by mouth daily at bedtime   metFORMIN (GLUCOPHAGE-XR) 750 mg 24 hr tablet   No No   Sig: Take 1 tablet (750 mg total) by mouth daily with breakfast   nicotine (NICODERM CQ) 14 mg/24hr TD 24 hr patch   No No   Sig: Place 1 patch on the skin over 24 hours daily   omega-3-acid ethyl esters (LOVAZA) 1 g capsule   No No   Sig: Take 2 capsules (2 g total) by mouth 2 (two) times a day      Facility-Administered Medications: None     Discharge Medication List as of 6/26/2025  7:06 PM        CONTINUE these medications which have NOT CHANGED    Details   ammonium lactate (LAC-HYDRIN) 12 % lotion Apply topically 2 (two) times a day as needed for dry skin, Starting Fri 11/1/2024, Normal      atorvastatin (LIPITOR) 20 mg tablet Take 1 tablet (20 mg total) by mouth daily, Starting Fri 11/1/2024, Normal      lisinopril (ZESTRIL) 5 mg tablet Take 1 tablet (5 mg total) by mouth daily, Starting Fri 11/1/2024, Normal      melatonin 1 mg Take 1 tablet (1 mg total) by mouth daily at bedtime, Starting Tue 7/23/2024, Normal      metFORMIN (GLUCOPHAGE-XR) 750 mg 24 hr tablet Take 1 tablet (750 mg total) by mouth daily with breakfast, Starting Fri 11/1/2024, Normal      nicotine (NICODERM CQ) 14 mg/24hr TD 24 hr patch Place 1 patch on the skin over 24 hours daily, Starting Fri 7/19/2024, Normal      omega-3-acid ethyl esters (LOVAZA) 1 g capsule  Take 2 capsules (2 g total) by mouth 2 (two) times a day, Starting Fri 11/1/2024, Normal           No discharge procedures on file.  ED SEPSIS DOCUMENTATION   Time reflects when diagnosis was documented in both MDM as applicable and the Disposition within this note       Time User Action Codes Description Comment    6/26/2025  7:06 PM Leonidas Malagon Add [R11.10] Vomiting     6/26/2025  7:06 PM Leonidas Malagon [R06.6] Hiccup                      [1]   Past Medical History:  Diagnosis Date    Diabetes mellitus (HCC)    [2] No past surgical history on file.  [3]   Family History  Problem Relation Name Age of Onset    Diabetes Mother     [4]   Social History  Tobacco Use    Smoking status: Heavy Smoker     Current packs/day: 1.50     Types: Cigarettes     Passive exposure: Current    Smokeless tobacco: Never   Vaping Use    Vaping status: Never Used   Substance Use Topics    Alcohol use: Yes    Drug use: Yes     Types: Marijuana     Comment: SUSAN Malagon MD  06/26/25 1415